# Patient Record
Sex: FEMALE | Race: WHITE | URBAN - METROPOLITAN AREA
[De-identification: names, ages, dates, MRNs, and addresses within clinical notes are randomized per-mention and may not be internally consistent; named-entity substitution may affect disease eponyms.]

---

## 2020-06-19 ENCOUNTER — OFFICE VISIT (OUTPATIENT)
Dept: URBAN - METROPOLITAN AREA CLINIC 97 | Facility: CLINIC | Age: 40
End: 2020-06-19

## 2020-07-02 ENCOUNTER — OFFICE VISIT (OUTPATIENT)
Dept: URBAN - METROPOLITAN AREA CLINIC 97 | Facility: CLINIC | Age: 40
End: 2020-07-02
Payer: COMMERCIAL

## 2020-07-02 VITALS
WEIGHT: 145 LBS | TEMPERATURE: 97.7 F | SYSTOLIC BLOOD PRESSURE: 112 MMHG | DIASTOLIC BLOOD PRESSURE: 75 MMHG | HEIGHT: 60 IN | BODY MASS INDEX: 28.47 KG/M2 | RESPIRATION RATE: 18 BRPM

## 2020-07-02 DIAGNOSIS — K50.80 CROHN'S COLITIS: ICD-10-CM

## 2020-07-02 PROCEDURE — 96413 CHEMO IV INFUSION 1 HR: CPT | Performed by: INTERNAL MEDICINE

## 2020-07-02 RX ORDER — IBUPROFEN 200 MG
TABLET ORAL
Qty: 0 | Refills: 0 | Status: ACTIVE | COMMUNITY
Start: 1900-01-01 | End: 1900-01-01

## 2020-07-02 RX ORDER — INFLIXIMAB 100 MG/10ML
INFUSE 5 MG/KG OVER NO LESS THAN 2 HOUR(S) BY INTRAVENOUS ROUTE EVERY 8 WEEKS INJECTION, POWDER, LYOPHILIZED, FOR SOLUTION INTRAVENOUS
Qty: 1 | Refills: 0 | Status: ACTIVE | COMMUNITY
Start: 1900-01-01 | End: 1900-01-01

## 2020-07-30 ENCOUNTER — OFFICE VISIT (OUTPATIENT)
Dept: URBAN - METROPOLITAN AREA CLINIC 97 | Facility: CLINIC | Age: 40
End: 2020-07-30

## 2020-08-28 ENCOUNTER — OFFICE VISIT (OUTPATIENT)
Dept: URBAN - METROPOLITAN AREA CLINIC 97 | Facility: CLINIC | Age: 40
End: 2020-08-28
Payer: COMMERCIAL

## 2020-08-28 VITALS
HEIGHT: 60 IN | WEIGHT: 146 LBS | BODY MASS INDEX: 28.66 KG/M2 | RESPIRATION RATE: 16 BRPM | DIASTOLIC BLOOD PRESSURE: 70 MMHG | SYSTOLIC BLOOD PRESSURE: 111 MMHG | TEMPERATURE: 96.9 F

## 2020-08-28 DIAGNOSIS — K50.80 CROHN'S COLITIS: ICD-10-CM

## 2020-08-28 PROCEDURE — 96413 CHEMO IV INFUSION 1 HR: CPT | Performed by: INTERNAL MEDICINE

## 2020-08-28 RX ORDER — IBUPROFEN 200 MG
TABLET ORAL
Qty: 0 | Refills: 0 | Status: ACTIVE | COMMUNITY
Start: 1900-01-01 | End: 1900-01-01

## 2020-08-28 RX ORDER — INFLIXIMAB 100 MG/10ML
INFUSE 5 MG/KG OVER NO LESS THAN 2 HOUR(S) BY INTRAVENOUS ROUTE EVERY 8 WEEKS INJECTION, POWDER, LYOPHILIZED, FOR SOLUTION INTRAVENOUS
Qty: 1 | Refills: 0 | Status: ACTIVE | COMMUNITY
Start: 1900-01-01 | End: 1900-01-01

## 2020-09-11 ENCOUNTER — OFFICE VISIT (OUTPATIENT)
Dept: URBAN - METROPOLITAN AREA CLINIC 97 | Facility: CLINIC | Age: 40
End: 2020-09-11

## 2020-10-20 ENCOUNTER — OFFICE VISIT (OUTPATIENT)
Dept: URBAN - METROPOLITAN AREA CLINIC 18 | Facility: CLINIC | Age: 40
End: 2020-10-20

## 2020-10-23 ENCOUNTER — OFFICE VISIT (OUTPATIENT)
Dept: URBAN - METROPOLITAN AREA CLINIC 97 | Facility: CLINIC | Age: 40
End: 2020-10-23
Payer: COMMERCIAL

## 2020-10-23 ENCOUNTER — LAB OUTSIDE AN ENCOUNTER (OUTPATIENT)
Dept: URBAN - METROPOLITAN AREA CLINIC 98 | Facility: CLINIC | Age: 40
End: 2020-10-23

## 2020-10-23 VITALS
RESPIRATION RATE: 16 BRPM | WEIGHT: 149 LBS | TEMPERATURE: 97.3 F | DIASTOLIC BLOOD PRESSURE: 70 MMHG | HEIGHT: 60 IN | BODY MASS INDEX: 29.25 KG/M2 | HEART RATE: 91 BPM | SYSTOLIC BLOOD PRESSURE: 122 MMHG

## 2020-10-23 DIAGNOSIS — K50.80 CROHN'S COLITIS: ICD-10-CM

## 2020-10-23 PROCEDURE — 96413 CHEMO IV INFUSION 1 HR: CPT | Performed by: INTERNAL MEDICINE

## 2020-10-23 RX ORDER — INFLIXIMAB 100 MG/10ML
INFUSE 5 MG/KG OVER NO LESS THAN 2 HOUR(S) BY INTRAVENOUS ROUTE EVERY 8 WEEKS INJECTION, POWDER, LYOPHILIZED, FOR SOLUTION INTRAVENOUS
Qty: 1 | Refills: 0 | Status: ACTIVE | COMMUNITY
Start: 1900-01-01 | End: 1900-01-01

## 2020-10-23 RX ORDER — IBUPROFEN 200 MG
TABLET ORAL
Qty: 0 | Refills: 0 | Status: ACTIVE | COMMUNITY
Start: 1900-01-01 | End: 1900-01-01

## 2020-11-02 LAB
A/G RATIO: 1.4
ALBUMIN: 4.2
ALKALINE PHOSPHATASE: 33
ALT (SGPT): 14
ANTI-INFLIXIMAB ANTIBODY: 23
AST (SGOT): 15
BASO (ABSOLUTE): 0
BASOS: 1
BILIRUBIN, TOTAL: <0.2
BUN/CREATININE RATIO: 22
BUN: 15
C-REACTIVE PROTEIN, QUANT: <1
CALCIUM: 9.1
CARBON DIOXIDE, TOTAL: 19
CHLORIDE: 106
CREATININE: 0.68
EGFR IF AFRICN AM: 127
EGFR IF NONAFRICN AM: 111
EOS (ABSOLUTE): 0.2
EOS: 3
FERRITIN, SERUM: 46
FOLATE (FOLIC ACID), SERUM: 13.9
GLOBULIN, TOTAL: 2.9
GLUCOSE: 98
HEMATOCRIT: 37.9
HEMATOLOGY COMMENTS:: (no result)
HEMOGLOBIN: 12.8
IMMATURE CELLS: (no result)
IMMATURE GRANS (ABS): 0
IMMATURE GRANULOCYTES: 0
INFLIXIMAB DRUG LEVEL: 7
IRON BIND.CAP.(TIBC): 289
IRON SATURATION: 19
IRON: 56
LYMPHS (ABSOLUTE): 3.1
LYMPHS: 38
MCH: 30.8
MCHC: 33.8
MCV: 91
MONOCYTES(ABSOLUTE): 0.7
MONOCYTES: 8
NEUTROPHILS (ABSOLUTE): 4.2
NEUTROPHILS: 50
NRBC: (no result)
PLATELETS: 376
POTASSIUM: 4.3
PROTEIN, TOTAL: 7.1
RBC: 4.15
RDW: 12.5
SEDIMENTATION RATE-WESTERGREN: 16
SODIUM: 139
UIBC: 233
VITAMIN B12: 444
VITAMIN D, 25-HYDROXY: 33.5
WBC: 8.2

## 2020-11-19 ENCOUNTER — OFFICE VISIT (OUTPATIENT)
Dept: URBAN - METROPOLITAN AREA TELEHEALTH 2 | Facility: TELEHEALTH | Age: 40
End: 2020-11-19
Payer: COMMERCIAL

## 2020-11-19 DIAGNOSIS — K50.80 CROHN'S COLITIS: ICD-10-CM

## 2020-11-19 PROCEDURE — 82397 CHEMILUMINESCENT ASSAY: CPT | Performed by: INTERNAL MEDICINE

## 2020-11-19 PROCEDURE — 99214 OFFICE O/P EST MOD 30 MIN: CPT | Performed by: INTERNAL MEDICINE

## 2020-11-19 RX ORDER — IBUPROFEN 200 MG
TABLET ORAL
Qty: 0 | Refills: 0 | Status: ACTIVE | COMMUNITY
Start: 1900-01-01

## 2020-11-19 RX ORDER — INFLIXIMAB 100 MG/10ML
INFUSE 5 MG/KG OVER NO LESS THAN 2 HOUR(S) BY INTRAVENOUS ROUTE EVERY 8 WEEKS INJECTION, POWDER, LYOPHILIZED, FOR SOLUTION INTRAVENOUS
Qty: 1 | Refills: 0 | Status: ACTIVE | COMMUNITY
Start: 1900-01-01

## 2020-11-19 NOTE — HPI-OTHER HISTORIES
41 yo female with CD. Doing well. Birthday. Staying. Remicade infusions going well. Had a 2-3 day flare before the last infusion. Had some this week.  Labs dated 10/23/20 showed IFX level of 7 and ati of 23 and will repeat with prometheus. Generally no pain diarrhea or bleeding. AGWS. No fcs. No EIM No covid  Only question is what ir prometh show ati ====================== 20    38 yo female for TH annual f/u visit. Doing well. No new syx over the past year.  No known COVID exposure ----- more or less self quarantined at home - Telehealth --- [facility].  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 Clinicals at [place] and [place].  Crohn's is lifelong.    Goal is to keep well. Periodic labs - 6-12 months. Colonoscopy 2 years preventive.  Remicade is tx and if we successfully tx, then we also prevent complications. Changing the natural history.  I reviewed the 7/15/19 colonoscopy with Aliya and it showed redness in the rectum and bx showed inflammation in 3 sites: rectum - minimal active inflammation, cecum- focal active colitis, and ileum showed active enteritis.  I sent Aliya a letter and summarized and suggested/recommended a dose increase of Remicade to 10 mg/kg for a limited time to see if the inflammatin could be better controlled.  She chose not to do that which is fine.  Now, we could recheck labs and also get stool biomarkers and see what inflammation level there is.  She agrees with that plan.   If markers are elevated, she would   Been taking Vit D, 10,000 for 8 months and now less.   =================== 19  37 yo female with Crohn's disease comes in for f/u. Last seen- below- was  and has done great since that time.  Malaika 5 mg/kg q 8 weeks. 18 IFX level was 6.2 8 lab set was nl.   No other CD meds  No pain diarrhea, bleeding.   3 past colonoscopies and one in  showed ileocolitis. Last colon . No FH colon cancer. No FH CD.  Had less that 1 year of pred. Did not get past ordered Dexa.   =========== 18  36 yo female comes in for 4 year f/u with me after seeing Patel Orellana in 2017 and having challenges getting in to see me.  Has done great.  No pain diarrhea or bleeding except for occasional increased syx week 7 before week 8 infusion.  No colonosocpy since .  Labs look great from 2017.  s/p flu shot and pneumovax.  Up to date on TDAP, a 5.    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  GYN is fine with yearly checks.  Ileocolitis by  colonoscopy with ulceration of ileum and half of colon and healed ileum and colon at  colonoscopy but with "scarrring" in colon which likely calls for q 1 (or 2) years.       Review of Systems  ConstitutionalDenies : body aches, fever, weight gain, weight loss  CardiovascularDenies : chest pain, heart racing/skipping, high blood pressure  RespiratoryDenies : chronic cough, shortness of breath, wheezing or asthma symptoms  GastrointestinalDenies : Abdominal Pain/discomfort, Anal/Rectal Pain or Itching, Anal Spasm, Black Stool, Bloating/belching/gaseouness, Change of Bowel Habit, Constipation, Diarrhea/Loose Stool, Difficulty in Swallowing, Heartburn/esophageal reflux, Hemorrhoids, Indigestion, Mucus in Stool, Nausea/vomiting, Rectal Bleeding, Unintentional Weight Loss    Vitals  Date Time BP Position Site L\R Cuff Size HR RR TEMP (F) WT  HT  BMI kg/m2 BSA m2 O2 Sat HC     2020 11:16 AM         126lbs 4oz 5'   24.66 1.56          Assessment  Crohn's Disease     555.2    Plan  OrdersPatient not identified as an unhealthy alcohol user when screene () - - 2020  Influenza immunization administered or previously received () - - 2020  BMI screening above normal () - - 2020  Current tobacco non-user (CAD, cap, COPD, PV) (dm) (IBD) (1036F, ) - - 2020  Colorectal cancer screening results documented and reviewed (PV) (3017F) - - 2020  Documentation of current medications. () - - 2020  CMP (comprehensive metabolic panel) (73613) - - 2020  Sed rate (86428) - - 2020  C-reactive protein (96130) - - 2020  Ferritin assay (74755) - - 2020  Iron + iron binding capacity panel ser/plas (29934, 15741) - - 2020  Vitamin B12 and folate measurement (64470, 84248) - - 2020  CBC with diff (44427) - - 2020  25-OH-Vitamin D (32652) - - 2020  Infliximab Concentration and Anti-Infliximab Antibody (91077) - - 2020  Lactoferrin stl QN (46093) - - 2020  Calprotectin stool (75311) - - 2020  InstructionsPatient seen today via telehealth by agreement and consent of patient in light of current COVID-19 pandemic. I used video conferencing during the visit. The patient encounter is appropriate and reasonable under the circumstances given the patient's particular presentation at this time. The patient has been advised of the followin) the potential risks and limitations of this mode of treatment (including but not limited to the absence of in-person examination); 2) the right to refuse telehealth services at any point without affecting the right to future care; 3) the right to receive in-person services, included immediately after this consultation if an urgent need arises; 4) information, including identifiable images or information from this telehealth consult, will only be shared in accordance with HIPPA regulations. Any and all of the patient's and/or patient's family member's questions on this issue have been answered. The patient has verbally consented to be treated via telehealth services. The patient has also been advised to contact this office for worsening conditions or problems, and seek emergency medical treatment and/or call 911 if the patient deems either necessary.  (For commercial payers, telehealth video only) More than half of the face-to-face time used for counseling and coordination of care.  40 min appointment (63176 EP)  I have documented a list of current medications and reviewed it with the patient.  I encouraged the patient to diet and exercise.    Get trough labs including stool biomarkers including IFX level before next infusion  Then TH visit 2 weeks after to discuss short term increase of Malaika to 10 mg/kg   Exam med 31 min [id number]         Electronically Signed by: Rikki Franks MD -Author on May 22, 2020 10:20:34 AM

## 2020-12-18 ENCOUNTER — LAB OUTSIDE AN ENCOUNTER (OUTPATIENT)
Dept: URBAN - METROPOLITAN AREA CLINIC 98 | Facility: CLINIC | Age: 40
End: 2020-12-18

## 2020-12-18 ENCOUNTER — OFFICE VISIT (OUTPATIENT)
Dept: URBAN - METROPOLITAN AREA CLINIC 97 | Facility: CLINIC | Age: 40
End: 2020-12-18
Payer: COMMERCIAL

## 2020-12-18 DIAGNOSIS — K50.80 CROHN'S COLITIS: ICD-10-CM

## 2020-12-18 PROCEDURE — 96413 CHEMO IV INFUSION 1 HR: CPT | Performed by: INTERNAL MEDICINE

## 2020-12-18 RX ORDER — IBUPROFEN 200 MG
TABLET ORAL
Qty: 0 | Refills: 0 | Status: ACTIVE | COMMUNITY
Start: 1900-01-01

## 2020-12-18 RX ORDER — INFLIXIMAB 100 MG/10ML
INFUSE 5 MG/KG OVER NO LESS THAN 2 HOUR(S) BY INTRAVENOUS ROUTE EVERY 8 WEEKS INJECTION, POWDER, LYOPHILIZED, FOR SOLUTION INTRAVENOUS
Qty: 1 | Refills: 0 | Status: ACTIVE | COMMUNITY
Start: 1900-01-01

## 2021-01-05 LAB
A/G RATIO: 1.4
ALBUMIN: 4.2
ALKALINE PHOSPHATASE: 34
ALT (SGPT): 13
ANTI-INFLIXIMAB ANTIBODY: 25
AST (SGOT): 16
BASO (ABSOLUTE): 0.1
BASOS: 1
BILIRUBIN, TOTAL: 0.2
BUN/CREATININE RATIO: 21
BUN: 14
C-REACTIVE PROTEIN, QUANT: <1
CALCIUM: 9.2
CARBON DIOXIDE, TOTAL: 18
CHLORIDE: 105
CREATININE: 0.68
EGFR IF AFRICN AM: 127
EGFR IF NONAFRICN AM: 110
EOS (ABSOLUTE): 0.3
EOS: 2
GLOBULIN, TOTAL: 3.1
GLUCOSE: 88
HEMATOCRIT: 41.1
HEMATOLOGY COMMENTS:: (no result)
HEMOGLOBIN: 13.5
IMMATURE CELLS: (no result)
IMMATURE GRANS (ABS): 0
IMMATURE GRANULOCYTES: 0
INFLIXIMAB DRUG LEVEL: 7.4
LYMPHS (ABSOLUTE): 4
LYMPHS: 38
MCH: 30.5
MCHC: 32.8
MCV: 93
MONOCYTES(ABSOLUTE): 0.9
MONOCYTES: 9
NEUTROPHILS (ABSOLUTE): 5.2
NEUTROPHILS: 50
NRBC: (no result)
PLATELETS: 413
POTASSIUM: 4.3
PROTEIN, TOTAL: 7.3
RBC: 4.43
RDW: 11.9
SEDIMENTATION RATE-WESTERGREN: 15
SODIUM: 139
WBC: 10.4

## 2021-02-12 ENCOUNTER — OFFICE VISIT (OUTPATIENT)
Dept: URBAN - METROPOLITAN AREA CLINIC 97 | Facility: CLINIC | Age: 41
End: 2021-02-12
Payer: COMMERCIAL

## 2021-02-12 DIAGNOSIS — K50.80 CROHN'S COLITIS: ICD-10-CM

## 2021-02-12 PROCEDURE — 96413 CHEMO IV INFUSION 1 HR: CPT | Performed by: INTERNAL MEDICINE

## 2021-02-12 RX ORDER — INFLIXIMAB 100 MG/10ML
INFUSE 5 MG/KG OVER NO LESS THAN 2 HOUR(S) BY INTRAVENOUS ROUTE EVERY 8 WEEKS INJECTION, POWDER, LYOPHILIZED, FOR SOLUTION INTRAVENOUS
Qty: 1 | Refills: 0 | Status: ACTIVE | COMMUNITY
Start: 1900-01-01

## 2021-02-12 RX ORDER — IBUPROFEN 200 MG
TABLET ORAL
Qty: 0 | Refills: 0 | Status: ACTIVE | COMMUNITY
Start: 1900-01-01

## 2021-04-09 ENCOUNTER — OFFICE VISIT (OUTPATIENT)
Dept: URBAN - METROPOLITAN AREA CLINIC 97 | Facility: CLINIC | Age: 41
End: 2021-04-09
Payer: COMMERCIAL

## 2021-04-09 DIAGNOSIS — K50.80 CROHN'S COLITIS: ICD-10-CM

## 2021-04-09 PROCEDURE — 96413 CHEMO IV INFUSION 1 HR: CPT | Performed by: INTERNAL MEDICINE

## 2021-04-09 RX ORDER — IBUPROFEN 200 MG
TABLET ORAL
Qty: 0 | Refills: 0 | Status: ACTIVE | COMMUNITY
Start: 1900-01-01

## 2021-04-09 RX ORDER — INFLIXIMAB 100 MG/10ML
INFUSE 5 MG/KG OVER NO LESS THAN 2 HOUR(S) BY INTRAVENOUS ROUTE EVERY 8 WEEKS INJECTION, POWDER, LYOPHILIZED, FOR SOLUTION INTRAVENOUS
Qty: 1 | Refills: 0 | Status: ACTIVE | COMMUNITY
Start: 1900-01-01

## 2021-06-11 ENCOUNTER — OFFICE VISIT (OUTPATIENT)
Dept: URBAN - METROPOLITAN AREA CLINIC 97 | Facility: CLINIC | Age: 41
End: 2021-06-11
Payer: COMMERCIAL

## 2021-06-11 ENCOUNTER — TELEPHONE ENCOUNTER (OUTPATIENT)
Dept: URBAN - METROPOLITAN AREA CLINIC 97 | Facility: CLINIC | Age: 41
End: 2021-06-11

## 2021-06-11 VITALS
HEIGHT: 60 IN | TEMPERATURE: 96.7 F | DIASTOLIC BLOOD PRESSURE: 79 MMHG | HEART RATE: 96 BPM | WEIGHT: 156 LBS | BODY MASS INDEX: 30.63 KG/M2 | RESPIRATION RATE: 18 BRPM | SYSTOLIC BLOOD PRESSURE: 105 MMHG

## 2021-06-11 DIAGNOSIS — K50.80 CROHN'S COLITIS: ICD-10-CM

## 2021-06-11 PROCEDURE — 96413 CHEMO IV INFUSION 1 HR: CPT | Performed by: INTERNAL MEDICINE

## 2021-06-11 RX ORDER — INFLIXIMAB 100 MG/10ML
INFUSE 5 MG/KG OVER NO LESS THAN 2 HOUR(S) BY INTRAVENOUS ROUTE EVERY 8 WEEKS INJECTION, POWDER, LYOPHILIZED, FOR SOLUTION INTRAVENOUS
Qty: 1 | Refills: 0 | Status: ACTIVE | COMMUNITY
Start: 1900-01-01

## 2021-06-11 RX ORDER — IBUPROFEN 200 MG
TABLET ORAL
Qty: 0 | Refills: 0 | Status: ACTIVE | COMMUNITY
Start: 1900-01-01

## 2021-06-12 ENCOUNTER — TELEPHONE ENCOUNTER (OUTPATIENT)
Dept: URBAN - METROPOLITAN AREA CLINIC 92 | Facility: CLINIC | Age: 41
End: 2021-06-12

## 2021-06-12 RX ORDER — INFLIXIMAB 100 MG/10ML
INFUSE 5 MG/KG OVER NO LESS THAN 2 HOUR(S) BY INTRAVENOUS ROUTE EVERY 8 WEEKS INJECTION, POWDER, LYOPHILIZED, FOR SOLUTION INTRAVENOUS
Qty: 1 | Refills: 0 | Status: ACTIVE | COMMUNITY
Start: 1900-01-01

## 2021-06-12 RX ORDER — INFLIXIMAB 100 MG/10ML
5MG/KG INJECTION, POWDER, LYOPHILIZED, FOR SOLUTION INTRAVENOUS
Qty: 1 BAG | Refills: 6 | OUTPATIENT
Start: 2021-06-12 | End: 2022-07-09

## 2021-06-12 RX ORDER — IBUPROFEN 200 MG
TABLET ORAL
Qty: 0 | Refills: 0 | Status: ACTIVE | COMMUNITY
Start: 1900-01-01

## 2021-08-05 ENCOUNTER — OFFICE VISIT (OUTPATIENT)
Dept: URBAN - METROPOLITAN AREA CLINIC 97 | Facility: CLINIC | Age: 41
End: 2021-08-05
Payer: COMMERCIAL

## 2021-08-05 DIAGNOSIS — K50.80 CROHN'S COLITIS: ICD-10-CM

## 2021-08-05 PROCEDURE — 96413 CHEMO IV INFUSION 1 HR: CPT | Performed by: INTERNAL MEDICINE

## 2021-08-05 RX ORDER — INFLIXIMAB 100 MG/10ML
INFUSE 5 MG/KG OVER NO LESS THAN 2 HOUR(S) BY INTRAVENOUS ROUTE EVERY 8 WEEKS INJECTION, POWDER, LYOPHILIZED, FOR SOLUTION INTRAVENOUS
Qty: 1 | Refills: 0 | Status: ACTIVE | COMMUNITY
Start: 1900-01-01

## 2021-08-05 RX ORDER — INFLIXIMAB 100 MG/10ML
5MG/KG INJECTION, POWDER, LYOPHILIZED, FOR SOLUTION INTRAVENOUS
Qty: 1 BAG | Refills: 6 | Status: ACTIVE | COMMUNITY
Start: 2021-06-12 | End: 2022-07-09

## 2021-08-05 RX ORDER — IBUPROFEN 200 MG
TABLET ORAL
Qty: 0 | Refills: 0 | Status: ACTIVE | COMMUNITY
Start: 1900-01-01

## 2021-10-01 ENCOUNTER — OFFICE VISIT (OUTPATIENT)
Dept: URBAN - METROPOLITAN AREA CLINIC 97 | Facility: CLINIC | Age: 41
End: 2021-10-01

## 2021-10-11 ENCOUNTER — OFFICE VISIT (OUTPATIENT)
Dept: URBAN - METROPOLITAN AREA CLINIC 97 | Facility: CLINIC | Age: 41
End: 2021-10-11
Payer: COMMERCIAL

## 2021-10-11 VITALS
HEIGHT: 60 IN | HEART RATE: 101 BPM | WEIGHT: 155 LBS | SYSTOLIC BLOOD PRESSURE: 110 MMHG | BODY MASS INDEX: 30.43 KG/M2 | DIASTOLIC BLOOD PRESSURE: 75 MMHG | TEMPERATURE: 97.8 F | RESPIRATION RATE: 18 BRPM

## 2021-10-11 DIAGNOSIS — K50.80 CROHN'S COLITIS: ICD-10-CM

## 2021-10-11 PROCEDURE — 96413 CHEMO IV INFUSION 1 HR: CPT | Performed by: INTERNAL MEDICINE

## 2021-10-11 RX ORDER — INFLIXIMAB 100 MG/10ML
INFUSE 5 MG/KG OVER NO LESS THAN 2 HOUR(S) BY INTRAVENOUS ROUTE EVERY 8 WEEKS INJECTION, POWDER, LYOPHILIZED, FOR SOLUTION INTRAVENOUS
Qty: 1 | Refills: 0 | Status: ACTIVE | COMMUNITY
Start: 1900-01-01

## 2021-10-11 RX ORDER — IBUPROFEN 200 MG
TABLET ORAL
Qty: 0 | Refills: 0 | Status: ACTIVE | COMMUNITY
Start: 1900-01-01

## 2021-10-11 RX ORDER — INFLIXIMAB 100 MG/10ML
5MG/KG INJECTION, POWDER, LYOPHILIZED, FOR SOLUTION INTRAVENOUS
Qty: 1 BAG | Refills: 6 | Status: ACTIVE | COMMUNITY
Start: 2021-06-12 | End: 2022-07-09

## 2021-12-06 ENCOUNTER — OFFICE VISIT (OUTPATIENT)
Dept: URBAN - METROPOLITAN AREA CLINIC 97 | Facility: CLINIC | Age: 41
End: 2021-12-06
Payer: COMMERCIAL

## 2021-12-06 ENCOUNTER — TELEPHONE ENCOUNTER (OUTPATIENT)
Dept: URBAN - METROPOLITAN AREA CLINIC 97 | Facility: CLINIC | Age: 41
End: 2021-12-06

## 2021-12-06 VITALS
TEMPERATURE: 98.1 F | WEIGHT: 154 LBS | DIASTOLIC BLOOD PRESSURE: 77 MMHG | BODY MASS INDEX: 30.23 KG/M2 | SYSTOLIC BLOOD PRESSURE: 110 MMHG | RESPIRATION RATE: 18 BRPM | HEART RATE: 87 BPM | HEIGHT: 60 IN

## 2021-12-06 DIAGNOSIS — K50.80 CROHN'S COLITIS: ICD-10-CM

## 2021-12-06 PROCEDURE — 96413 CHEMO IV INFUSION 1 HR: CPT | Performed by: INTERNAL MEDICINE

## 2021-12-06 RX ORDER — INFLIXIMAB 100 MG/10ML
INFUSE 5 MG/KG OVER NO LESS THAN 2 HOUR(S) BY INTRAVENOUS ROUTE EVERY 8 WEEKS INJECTION, POWDER, LYOPHILIZED, FOR SOLUTION INTRAVENOUS
Qty: 1 | Refills: 0 | Status: ACTIVE | COMMUNITY
Start: 1900-01-01

## 2021-12-06 RX ORDER — IBUPROFEN 200 MG
TABLET ORAL
Qty: 0 | Refills: 0 | Status: ACTIVE | COMMUNITY
Start: 1900-01-01

## 2021-12-06 RX ORDER — INFLIXIMAB 100 MG/10ML
5MG/KG INJECTION, POWDER, LYOPHILIZED, FOR SOLUTION INTRAVENOUS
Qty: 1 BAG | Refills: 6 | Status: ACTIVE | COMMUNITY
Start: 2021-06-12 | End: 2022-07-09

## 2021-12-07 ENCOUNTER — TELEPHONE ENCOUNTER (OUTPATIENT)
Dept: URBAN - METROPOLITAN AREA CLINIC 92 | Facility: CLINIC | Age: 41
End: 2021-12-07

## 2022-01-12 ENCOUNTER — TELEPHONE ENCOUNTER (OUTPATIENT)
Dept: URBAN - METROPOLITAN AREA CLINIC 98 | Facility: CLINIC | Age: 42
End: 2022-01-12

## 2022-01-31 ENCOUNTER — OFFICE VISIT (OUTPATIENT)
Dept: URBAN - METROPOLITAN AREA CLINIC 97 | Facility: CLINIC | Age: 42
End: 2022-01-31
Payer: COMMERCIAL

## 2022-01-31 VITALS
HEIGHT: 60 IN | RESPIRATION RATE: 18 BRPM | HEART RATE: 102 BPM | SYSTOLIC BLOOD PRESSURE: 115 MMHG | BODY MASS INDEX: 30.63 KG/M2 | WEIGHT: 156 LBS | DIASTOLIC BLOOD PRESSURE: 77 MMHG | TEMPERATURE: 96.6 F

## 2022-01-31 DIAGNOSIS — K50.80 CROHN'S COLITIS: ICD-10-CM

## 2022-01-31 PROCEDURE — 96413 CHEMO IV INFUSION 1 HR: CPT | Performed by: INTERNAL MEDICINE

## 2022-01-31 RX ORDER — IBUPROFEN 200 MG
TABLET ORAL
Qty: 0 | Refills: 0 | Status: ACTIVE | COMMUNITY
Start: 1900-01-01

## 2022-01-31 RX ORDER — INFLIXIMAB 100 MG/10ML
5MG/KG INJECTION, POWDER, LYOPHILIZED, FOR SOLUTION INTRAVENOUS
Qty: 1 BAG | Refills: 6 | Status: ACTIVE | COMMUNITY
Start: 2021-06-12 | End: 2022-07-09

## 2022-01-31 RX ORDER — INFLIXIMAB 100 MG/10ML
INFUSE 5 MG/KG OVER NO LESS THAN 2 HOUR(S) BY INTRAVENOUS ROUTE EVERY 8 WEEKS INJECTION, POWDER, LYOPHILIZED, FOR SOLUTION INTRAVENOUS
Qty: 1 | Refills: 0 | Status: ACTIVE | COMMUNITY
Start: 1900-01-01

## 2022-03-21 ENCOUNTER — LAB OUTSIDE AN ENCOUNTER (OUTPATIENT)
Dept: URBAN - METROPOLITAN AREA CLINIC 98 | Facility: CLINIC | Age: 42
End: 2022-03-21

## 2022-03-28 ENCOUNTER — OFFICE VISIT (OUTPATIENT)
Dept: URBAN - METROPOLITAN AREA CLINIC 97 | Facility: CLINIC | Age: 42
End: 2022-03-28
Payer: COMMERCIAL

## 2022-03-28 VITALS
TEMPERATURE: 97.4 F | RESPIRATION RATE: 18 BRPM | BODY MASS INDEX: 30.63 KG/M2 | WEIGHT: 156 LBS | DIASTOLIC BLOOD PRESSURE: 81 MMHG | HEIGHT: 60 IN | HEART RATE: 92 BPM | SYSTOLIC BLOOD PRESSURE: 112 MMHG

## 2022-03-28 DIAGNOSIS — K50.80 CROHN'S COLITIS: ICD-10-CM

## 2022-03-28 LAB
A/G RATIO: 1.4
ALBUMIN: 4.5
ALKALINE PHOSPHATASE: 39
ALT (SGPT): 13
ANTI-INFLIXIMAB ANTIBODY: <22
AST (SGOT): 17
BASO (ABSOLUTE): 0.1
BASOS: 1
BILIRUBIN, TOTAL: <0.2
BUN/CREATININE RATIO: 17
BUN: 12
C-REACTIVE PROTEIN, QUANT: 1
CALCIUM: 9.5
CARBON DIOXIDE, TOTAL: 15
CHLORIDE: 102
CREATININE: 0.7
EGFR: 111
EOS (ABSOLUTE): 0.2
EOS: 2
FERRITIN, SERUM: 67
FOLATE (FOLIC ACID), SERUM: 16.4
GLOBULIN, TOTAL: 3.3
GLUCOSE: 79
HBSAG SCREEN: NEGATIVE
HEMATOCRIT: 41.4
HEMATOLOGY COMMENTS:: (no result)
HEMOGLOBIN: 13.6
HEP B SURFACE AB, QUAL: REACTIVE
IMMATURE CELLS: (no result)
IMMATURE GRANS (ABS): 0
IMMATURE GRANULOCYTES: 0
INFLIXIMAB DRUG LEVEL: 11
IRON BIND.CAP.(TIBC): 346
IRON SATURATION: 27
IRON: 95
LYMPHS (ABSOLUTE): 4.1
LYMPHS: 44
MCH: 30.1
MCHC: 32.9
MCV: 92
MONOCYTES(ABSOLUTE): 0.9
MONOCYTES: 10
NEUTROPHILS (ABSOLUTE): 4
NEUTROPHILS: 43
NRBC: (no result)
PLATELETS: 367
POTASSIUM: 4.3
PROTEIN, TOTAL: 7.8
QUANTIFERON CRITERIA: (no result)
QUANTIFERON INCUBATION: (no result)
QUANTIFERON MITOGEN VALUE: >10
QUANTIFERON NIL VALUE: 0.06
QUANTIFERON TB1 AG VALUE: 0.1
QUANTIFERON TB2 AG VALUE: 0.1
QUANTIFERON-TB GOLD PLUS: NEGATIVE
RBC: 4.52
RDW: 12.1
SEDIMENTATION RATE-WESTERGREN: 33
SODIUM: 140
UIBC: 251
VITAMIN B12: 345
VITAMIN D, 25-HYDROXY: 35.7
WBC: 9.3

## 2022-03-28 PROCEDURE — 96413 CHEMO IV INFUSION 1 HR: CPT | Performed by: INTERNAL MEDICINE

## 2022-03-28 RX ORDER — INFLIXIMAB 100 MG/10ML
INFUSE 5 MG/KG OVER NO LESS THAN 2 HOUR(S) BY INTRAVENOUS ROUTE EVERY 8 WEEKS INJECTION, POWDER, LYOPHILIZED, FOR SOLUTION INTRAVENOUS
Qty: 1 | Refills: 0 | Status: ACTIVE | COMMUNITY
Start: 1900-01-01

## 2022-03-28 RX ORDER — IBUPROFEN 200 MG
TABLET ORAL
Qty: 0 | Refills: 0 | Status: ACTIVE | COMMUNITY
Start: 1900-01-01

## 2022-03-28 RX ORDER — INFLIXIMAB 100 MG/10ML
5MG/KG INJECTION, POWDER, LYOPHILIZED, FOR SOLUTION INTRAVENOUS
Qty: 1 BAG | Refills: 6 | Status: ACTIVE | COMMUNITY
Start: 2021-06-12 | End: 2022-07-09

## 2022-04-08 ENCOUNTER — TELEPHONE ENCOUNTER (OUTPATIENT)
Dept: URBAN - METROPOLITAN AREA CLINIC 98 | Facility: CLINIC | Age: 42
End: 2022-04-08

## 2022-04-23 ENCOUNTER — TELEPHONE ENCOUNTER (OUTPATIENT)
Dept: URBAN - METROPOLITAN AREA CLINIC 92 | Facility: CLINIC | Age: 42
End: 2022-04-23

## 2022-04-23 RX ORDER — INFLIXIMAB 100 MG/10ML
AS DIRECTED INJECTION, POWDER, LYOPHILIZED, FOR SOLUTION INTRAVENOUS
Qty: 100 MILLIGRAMS | Refills: 0 | OUTPATIENT
Start: 2022-04-23 | End: 2022-05-23

## 2022-05-20 ENCOUNTER — WEB ENCOUNTER (OUTPATIENT)
Dept: URBAN - METROPOLITAN AREA CLINIC 97 | Facility: CLINIC | Age: 42
End: 2022-05-20

## 2022-05-25 ENCOUNTER — TELEPHONE ENCOUNTER (OUTPATIENT)
Dept: URBAN - METROPOLITAN AREA CLINIC 98 | Facility: CLINIC | Age: 42
End: 2022-05-25

## 2022-05-25 ENCOUNTER — OFFICE VISIT (OUTPATIENT)
Dept: URBAN - METROPOLITAN AREA CLINIC 97 | Facility: CLINIC | Age: 42
End: 2022-05-25
Payer: COMMERCIAL

## 2022-05-25 DIAGNOSIS — K50.90 CROHN DISEASE: ICD-10-CM

## 2022-05-25 PROCEDURE — 96413 CHEMO IV INFUSION 1 HR: CPT | Performed by: INTERNAL MEDICINE

## 2022-05-25 RX ORDER — IBUPROFEN 200 MG
TABLET ORAL
Qty: 0 | Refills: 0 | Status: ACTIVE | COMMUNITY
Start: 1900-01-01

## 2022-05-25 RX ORDER — INFLIXIMAB 100 MG/10ML
INFUSE 5 MG/KG OVER NO LESS THAN 2 HOUR(S) BY INTRAVENOUS ROUTE EVERY 8 WEEKS INJECTION, POWDER, LYOPHILIZED, FOR SOLUTION INTRAVENOUS
Qty: 1 | Refills: 0 | Status: ACTIVE | COMMUNITY
Start: 1900-01-01

## 2022-05-25 RX ORDER — INFLIXIMAB 100 MG/10ML
5MG/KG INJECTION, POWDER, LYOPHILIZED, FOR SOLUTION INTRAVENOUS
Qty: 1 BAG | Refills: 6 | Status: ACTIVE | COMMUNITY
Start: 2021-06-12 | End: 2022-07-09

## 2022-07-12 ENCOUNTER — WEB ENCOUNTER (OUTPATIENT)
Dept: URBAN - METROPOLITAN AREA CLINIC 97 | Facility: CLINIC | Age: 42
End: 2022-07-12

## 2022-07-13 ENCOUNTER — TELEPHONE ENCOUNTER (OUTPATIENT)
Dept: URBAN - METROPOLITAN AREA CLINIC 97 | Facility: CLINIC | Age: 42
End: 2022-07-13

## 2022-07-18 ENCOUNTER — OFFICE VISIT (OUTPATIENT)
Dept: URBAN - METROPOLITAN AREA CLINIC 97 | Facility: CLINIC | Age: 42
End: 2022-07-18

## 2022-07-18 ENCOUNTER — TELEPHONE ENCOUNTER (OUTPATIENT)
Dept: URBAN - METROPOLITAN AREA CLINIC 97 | Facility: CLINIC | Age: 42
End: 2022-07-18

## 2022-07-20 ENCOUNTER — OFFICE VISIT (OUTPATIENT)
Dept: URBAN - METROPOLITAN AREA CLINIC 97 | Facility: CLINIC | Age: 42
End: 2022-07-20
Payer: COMMERCIAL

## 2022-07-20 VITALS
RESPIRATION RATE: 18 BRPM | TEMPERATURE: 97.3 F | HEART RATE: 105 BPM | HEIGHT: 60 IN | WEIGHT: 156 LBS | SYSTOLIC BLOOD PRESSURE: 115 MMHG | DIASTOLIC BLOOD PRESSURE: 81 MMHG | BODY MASS INDEX: 30.63 KG/M2

## 2022-07-20 DIAGNOSIS — K50.80 CROHN'S COLITIS: ICD-10-CM

## 2022-07-20 PROCEDURE — 96413 CHEMO IV INFUSION 1 HR: CPT | Performed by: INTERNAL MEDICINE

## 2022-07-20 RX ORDER — INFLIXIMAB 100 MG/10ML
INFUSE 5 MG/KG OVER NO LESS THAN 2 HOUR(S) BY INTRAVENOUS ROUTE EVERY 8 WEEKS INJECTION, POWDER, LYOPHILIZED, FOR SOLUTION INTRAVENOUS
Qty: 1 | Refills: 0 | Status: ACTIVE | COMMUNITY
Start: 1900-01-01

## 2022-07-20 RX ORDER — IBUPROFEN 200 MG
TABLET ORAL
Qty: 0 | Refills: 0 | Status: ACTIVE | COMMUNITY
Start: 1900-01-01

## 2022-08-24 ENCOUNTER — TELEPHONE ENCOUNTER (OUTPATIENT)
Dept: URBAN - METROPOLITAN AREA CLINIC 98 | Facility: CLINIC | Age: 42
End: 2022-08-24

## 2022-08-29 ENCOUNTER — TELEPHONE ENCOUNTER (OUTPATIENT)
Dept: URBAN - METROPOLITAN AREA CLINIC 97 | Facility: CLINIC | Age: 42
End: 2022-08-29

## 2022-08-29 ENCOUNTER — OFFICE VISIT (OUTPATIENT)
Dept: URBAN - METROPOLITAN AREA TELEHEALTH 2 | Facility: TELEHEALTH | Age: 42
End: 2022-08-29
Payer: COMMERCIAL

## 2022-08-29 VITALS — HEIGHT: 60 IN | WEIGHT: 156 LBS | BODY MASS INDEX: 30.63 KG/M2

## 2022-08-29 DIAGNOSIS — K50.80 CROHN'S COLITIS: ICD-10-CM

## 2022-08-29 PROCEDURE — 99215 OFFICE O/P EST HI 40 MIN: CPT | Performed by: INTERNAL MEDICINE

## 2022-08-29 RX ORDER — INFLIXIMAB 100 MG/10ML
INFUSE 5 MG/KG OVER NO LESS THAN 2 HOUR(S) BY INTRAVENOUS ROUTE EVERY 8 WEEKS INJECTION, POWDER, LYOPHILIZED, FOR SOLUTION INTRAVENOUS
Qty: 1 | Refills: 0 | Status: ACTIVE | COMMUNITY
Start: 1900-01-01

## 2022-08-29 RX ORDER — IBUPROFEN 200 MG
TABLET ORAL
Qty: 0 | Refills: 0 | Status: ACTIVE | COMMUNITY
Start: 1900-01-01

## 2022-08-29 NOTE — HPI-TODAY'S VISIT:
40 yo female with CD dx . Been on Malaika since 2008.  At last TH visit 20 Aliya was doing well and on Lab dav assay had ifx level of 7 and low grade ati. Repeat as Prometheus showed no ati.  Had last dose of Remicade 1st quarter 2022 and now has had 2 doses of inflectra. She does not think the inflectra. Next infusion is   Has had standard dose Malaika 5mg /kg q 8 weeks and no pain diarrhea or bleeding.  Since starting inflectra she has had increased syx of pain and diarrhea as well as joint pain, now 4 weeks before the next infusion. Now has new rectal bleeding.    ======================= 20  41 yo female with CD. Doing well. Birthday. Staying. Remicade infusions going well. Had a 2-3 day flare before the last infusion. Had some this week.  Labs dated 10/23/20 showed IFX level of 7 and ati of 23 and will repeat with prometheus. Generally no pain diarrhea or bleeding. AGWS. No fcs. No EIM No covid  Only question is what ir prometh show ati ====================== 20    38 yo female for TH annual f/u visit. Doing well. No new syx over the past year.  No known COVID exposure ----- more or less self quarantined at home - Telehealth --- [facility] [employer] [facility].  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 Clinicals at [place] and [place].  Crohn's is lifelong.    Goal is to keep well. Periodic labs - 6-12 months. Colonoscopy 2 years preventive.  Remicade is tx and if we successfully tx, then we also prevent complications. Changing the natural history.  I reviewed the 7/15/19 colonoscopy with Aliya and it showed redness in the rectum and bx showed inflammation in 3 sites: rectum - minimal active inflammation, cecum- focal active colitis, and ileum showed active enteritis.  I sent Aliya a letter and summarized and suggested/recommended a dose increase of Remicade to 10 mg/kg for a limited time to see if the inflammatin could be better controlled.  She chose not to do that which is fine.  Now, we could recheck labs and also get stool biomarkers and see what inflammation level there is.  She agrees with that plan.   If markers are elevated, she would   Been taking Vit D, 10,000 for 8 months and now less.   =================== 19  37 yo female with Crohn's disease comes in for f/u. Last seen- below- was  and has done great since that time.  Malaika 5 mg/kg q 8 weeks. 18 IFX level was 6.2 8 lab set was nl.   No other CD meds  No pain diarrhea, bleeding.   3 past colonoscopies and one in  showed ileocolitis. Last colon . No FH colon cancer. No FH CD.  Had less that 1 year of pred. Did not get past ordered Dexa.   =========== 18  38 yo female comes in for 4 year f/u with me after seeing Patel Orellana in 2017 and having challenges getting in to see me.  Has done great.  No pain diarrhea or bleeding except for occasional increased syx week 7 before week 8 infusion.  No colonosocpy since .  Labs look great from 2017.  s/p flu shot and pneumovax.  Up to date on TDAP, a 5.    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  GYN is fine with yearly checks.  Ileocolitis by  colonoscopy with ulceration of ileum and half of colon and healed ileum and colon at  colonoscopy but with "scarrring" in colon which likely calls for q 1 (or 2) years.       Review of Systems  ConstitutionalDenies : body aches, fever, weight gain, weight loss  CardiovascularDenies : chest pain, heart racing/skipping, high blood pressure  RespiratoryDenies : chronic cough, shortness of breath, wheezing or asthma symptoms  GastrointestinalDenies : Abdominal Pain/discomfort, Anal/Rectal Pain or Itching, Anal Spasm, Black Stool, Bloating/belching/gaseouness, Change of Bowel Habit, Constipation, Diarrhea/Loose Stool, Difficulty in Swallowing, Heartburn/esophageal reflux, Hemorrhoids, Indigestion, Mucus in Stool, Nausea/vomiting, Rectal Bleeding, Unintentional Weight Loss    Vitals  Date Time BP Position Site L\R Cuff Size HR RR TEMP (F) WT  HT  BMI kg/m2 BSA m2 O2 Sat HC     2020 11:16 AM         126lbs 4oz 5'   24.66 1.56          Assessment  Crohn's Disease     555.2    Plan  OrdersPatient not identified as an unhealthy alcohol user when screene () - - 2020  Influenza immunization administered or previously received () - - 2020  BMI screening above normal () - - 2020  Current tobacco non-user (CAD, cap, COPD, PV) (dm) (IBD) (1036F, ) - - 2020  Colorectal cancer screening results documented and reviewed (PV) (3017F) - - 2020  Documentation of current medications. () - - 2020  CMP (comprehensive metabolic panel) (89106) - - 2020  Sed rate (17952) - - 2020  C-reactive protein (85306) - - 2020  Ferritin assay (57845) - - 2020  Iron + iron binding capacity panel ser/plas (10591, 82448) - - 2020  Vitamin B12 and folate measurement (13467, 33277) - - 2020  CBC with diff (56024) - - 2020  25-OH-Vitamin D (25735) - - 2020  Infliximab Concentration and Anti-Infliximab Antibody (50997) - - 2020  Lactoferrin stl QN (20986) - - 2020  Calprotectin stool (32552) - - 2020  InstructionsPatient seen today via telehealth by agreement and consent of patient in light of current COVID-19 pandemic. I used video conferencing during the visit. The patient encounter is appropriate and reasonable under the circumstances given the patient's particular presentation at this time. The patient has been advised of the followin) the potential risks and limitations of this mode of treatment (including but not limited to the absence of in-person examination); 2) the right to refuse telehealth services at any point without affecting the right to future care; 3) the right to receive in-person services, included immediately after this consultation if an urgent need arises; 4) information, including identifiable images or information from this telehealth consult, will only be shared in accordance with HIPPA regulations. Any and all of the patient's and/or patient's family member's questions on this issue have been answered. The patient has verbally consented to be treated via telehealth services. The patient has also been advised to contact this office for worsening conditions or problems, and seek emergency medical treatment and/or call 911 if the patient deems either necessary.  (For commercial payers, telehealth video only) More than half of the face-to-face time used for counseling and coordination of care.  40 min appointment (45941 EP)  I have documented a list of current medications and reviewed it with the patient.  I encouraged the patient to diet and exercise.    Get trough labs including stool biomarkers including IFX level before next infusion  Then TH visit 2 weeks after to discuss short term increase of Malaika to 10 mg/kg   Exam med 31 min [id number]         Electronically Signed by: Rikki Franks MD -Author on May 22, 2020 10:20:34 AM

## 2022-08-30 ENCOUNTER — TELEPHONE ENCOUNTER (OUTPATIENT)
Dept: URBAN - METROPOLITAN AREA CLINIC 97 | Facility: CLINIC | Age: 42
End: 2022-08-30

## 2022-09-04 ENCOUNTER — TELEPHONE ENCOUNTER (OUTPATIENT)
Dept: URBAN - METROPOLITAN AREA CLINIC 97 | Facility: CLINIC | Age: 42
End: 2022-09-04

## 2022-09-12 ENCOUNTER — OFFICE VISIT (OUTPATIENT)
Dept: URBAN - METROPOLITAN AREA CLINIC 97 | Facility: CLINIC | Age: 42
End: 2022-09-12

## 2022-09-12 ENCOUNTER — CLAIMS CREATED FROM THE CLAIM WINDOW (OUTPATIENT)
Dept: URBAN - METROPOLITAN AREA CLINIC 97 | Facility: CLINIC | Age: 42
End: 2022-09-12
Payer: COMMERCIAL

## 2022-09-12 VITALS
DIASTOLIC BLOOD PRESSURE: 79 MMHG | WEIGHT: 154 LBS | HEIGHT: 60 IN | RESPIRATION RATE: 20 BRPM | SYSTOLIC BLOOD PRESSURE: 121 MMHG | TEMPERATURE: 98.4 F | HEART RATE: 97 BPM | BODY MASS INDEX: 30.23 KG/M2

## 2022-09-12 DIAGNOSIS — K50.80 CROHN'S COLITIS: ICD-10-CM

## 2022-09-12 LAB
A/G RATIO: 1.4
ALBUMIN: 4.2
ALKALINE PHOSPHATASE: 43
ALT (SGPT): 17
ANTI-INFLIXIMAB ANTIBODY: <22
AST (SGOT): 20
BASO (ABSOLUTE): 0.1
BASOS: 1
BILIRUBIN, TOTAL: <0.2
BUN/CREATININE RATIO: 17
BUN: 11
C-REACTIVE PROTEIN, QUANT: 1
CALCIUM: 8.9
CALPROTECTIN, FECAL: (no result)
CARBON DIOXIDE, TOTAL: 18
CHLORIDE: 108
CREATININE: 0.65
EGFR: 113
EOS (ABSOLUTE): 0.2
EOS: 2
FERRITIN, SERUM: 61
FOLATE (FOLIC ACID), SERUM: 14.7
GLOBULIN, TOTAL: 2.9
GLUCOSE: 83
HEMATOCRIT: 41.8
HEMATOLOGY COMMENTS:: (no result)
HEMOGLOBIN: 13.2
IMMATURE CELLS: (no result)
IMMATURE GRANS (ABS): 0
IMMATURE GRANULOCYTES: 0
INFLIXIMAB DRUG LEVEL: 11
IRON BIND.CAP.(TIBC): 303
IRON SATURATION: 23
IRON: 69
LACTOFERRIN, FECAL, QUANT.: (no result)
LYMPHS (ABSOLUTE): 3.4
LYMPHS: 34
MCH: 29.6
MCHC: 31.6
MCV: 94
MONOCYTES(ABSOLUTE): 1
MONOCYTES: 10
NEUTROPHILS (ABSOLUTE): 5.3
NEUTROPHILS: 53
NRBC: (no result)
PLATELETS: 358
POTASSIUM: 4.6
PROTEIN, TOTAL: 7.1
RBC: 4.46
RDW: 12.4
REQUEST PROBLEM: (no result)
SEDIMENTATION RATE-WESTERGREN: 24
SODIUM: 141
UIBC: 234
VITAMIN B12: 381
VITAMIN D, 25-HYDROXY: 24.2
WBC: 10.1

## 2022-09-12 PROCEDURE — 96413 CHEMO IV INFUSION 1 HR: CPT | Performed by: INTERNAL MEDICINE

## 2022-09-12 RX ORDER — INFLIXIMAB 100 MG/10ML
INFUSE 5 MG/KG OVER NO LESS THAN 2 HOUR(S) BY INTRAVENOUS ROUTE EVERY 8 WEEKS INJECTION, POWDER, LYOPHILIZED, FOR SOLUTION INTRAVENOUS
Qty: 1 | Refills: 0 | Status: ACTIVE | COMMUNITY
Start: 1900-01-01

## 2022-09-12 RX ORDER — IBUPROFEN 200 MG
TABLET ORAL
Qty: 0 | Refills: 0 | Status: ACTIVE | COMMUNITY
Start: 1900-01-01

## 2022-09-16 ENCOUNTER — TELEPHONE ENCOUNTER (OUTPATIENT)
Dept: URBAN - METROPOLITAN AREA CLINIC 98 | Facility: CLINIC | Age: 42
End: 2022-09-16

## 2022-09-16 RX ORDER — ERGOCALCIFEROL CAPSULES, 1.25 MG/1
1 CAPSULE CAPSULE ORAL
Qty: 12 CAPSULES | Refills: 1 | OUTPATIENT
Start: 2022-09-16 | End: 2023-03-15

## 2022-09-26 ENCOUNTER — OFFICE VISIT (OUTPATIENT)
Dept: URBAN - METROPOLITAN AREA CLINIC 98 | Facility: CLINIC | Age: 42
End: 2022-09-26

## 2022-11-16 ENCOUNTER — OFFICE VISIT (OUTPATIENT)
Dept: URBAN - METROPOLITAN AREA CLINIC 97 | Facility: CLINIC | Age: 42
End: 2022-11-16
Payer: COMMERCIAL

## 2022-11-16 VITALS
DIASTOLIC BLOOD PRESSURE: 84 MMHG | SYSTOLIC BLOOD PRESSURE: 118 MMHG | HEART RATE: 84 BPM | RESPIRATION RATE: 20 BRPM | HEIGHT: 60 IN | TEMPERATURE: 98.6 F | BODY MASS INDEX: 30.82 KG/M2 | WEIGHT: 157 LBS

## 2022-11-16 DIAGNOSIS — K50.80 CROHN'S COLITIS: ICD-10-CM

## 2022-11-16 PROCEDURE — 96413 CHEMO IV INFUSION 1 HR: CPT | Performed by: INTERNAL MEDICINE

## 2022-11-16 RX ORDER — IBUPROFEN 200 MG
TABLET ORAL
Qty: 0 | Refills: 0 | Status: ACTIVE | COMMUNITY
Start: 1900-01-01

## 2022-11-16 RX ORDER — INFLIXIMAB 100 MG/10ML
INFUSE 5 MG/KG OVER NO LESS THAN 2 HOUR(S) BY INTRAVENOUS ROUTE EVERY 8 WEEKS INJECTION, POWDER, LYOPHILIZED, FOR SOLUTION INTRAVENOUS
Qty: 1 | Refills: 0 | Status: ACTIVE | COMMUNITY
Start: 1900-01-01

## 2022-11-16 RX ORDER — ERGOCALCIFEROL CAPSULES, 1.25 MG/1
1 CAPSULE CAPSULE ORAL
Qty: 12 CAPSULES | Refills: 1 | Status: ACTIVE | COMMUNITY
Start: 2022-09-16 | End: 2023-03-15

## 2023-01-09 ENCOUNTER — OFFICE VISIT (OUTPATIENT)
Dept: URBAN - METROPOLITAN AREA CLINIC 97 | Facility: CLINIC | Age: 43
End: 2023-01-09
Payer: COMMERCIAL

## 2023-01-09 VITALS
HEART RATE: 86 BPM | WEIGHT: 156.8 LBS | TEMPERATURE: 98 F | RESPIRATION RATE: 16 BRPM | BODY MASS INDEX: 30.78 KG/M2 | SYSTOLIC BLOOD PRESSURE: 114 MMHG | HEIGHT: 60 IN | DIASTOLIC BLOOD PRESSURE: 74 MMHG

## 2023-01-09 DIAGNOSIS — K50.80 CROHN'S COLITIS: ICD-10-CM

## 2023-01-09 PROCEDURE — 96413 CHEMO IV INFUSION 1 HR: CPT | Performed by: INTERNAL MEDICINE

## 2023-01-09 RX ORDER — ERGOCALCIFEROL CAPSULES, 1.25 MG/1
1 CAPSULE CAPSULE ORAL
Qty: 12 CAPSULES | Refills: 1 | Status: ACTIVE | COMMUNITY
Start: 2022-09-16 | End: 2023-03-15

## 2023-01-09 RX ORDER — IBUPROFEN 200 MG
TABLET ORAL
Qty: 0 | Refills: 0 | Status: ACTIVE | COMMUNITY
Start: 1900-01-01

## 2023-01-09 RX ORDER — INFLIXIMAB 100 MG/10ML
INFUSE 5 MG/KG OVER NO LESS THAN 2 HOUR(S) BY INTRAVENOUS ROUTE EVERY 8 WEEKS INJECTION, POWDER, LYOPHILIZED, FOR SOLUTION INTRAVENOUS
Qty: 1 | Refills: 0 | Status: ACTIVE | COMMUNITY
Start: 1900-01-01

## 2023-02-06 ENCOUNTER — OFFICE VISIT (OUTPATIENT)
Dept: URBAN - METROPOLITAN AREA CLINIC 98 | Facility: CLINIC | Age: 43
End: 2023-02-06
Payer: COMMERCIAL

## 2023-02-06 VITALS
HEART RATE: 92 BPM | DIASTOLIC BLOOD PRESSURE: 74 MMHG | TEMPERATURE: 98 F | SYSTOLIC BLOOD PRESSURE: 114 MMHG | HEIGHT: 60 IN | BODY MASS INDEX: 30.63 KG/M2 | WEIGHT: 156 LBS

## 2023-02-06 DIAGNOSIS — R11.2 NAUSEA AND VOMITING, UNSPECIFIED VOMITING TYPE: ICD-10-CM

## 2023-02-06 DIAGNOSIS — M25.50 ARTHRALGIA, UNSPECIFIED JOINT: ICD-10-CM

## 2023-02-06 DIAGNOSIS — K50.80 CROHN'S COLITIS: ICD-10-CM

## 2023-02-06 DIAGNOSIS — R19.7 DIARRHEA, UNSPECIFIED TYPE: ICD-10-CM

## 2023-02-06 DIAGNOSIS — K50.90 CROHN'S DISEASE: ICD-10-CM

## 2023-02-06 DIAGNOSIS — D84.9 IMMUNOSUPPRESSION: ICD-10-CM

## 2023-02-06 PROBLEM — 71833008 CROHN'S DISEASE OF SMALL AND LARGE INTESTINES: Status: ACTIVE | Noted: 2023-01-10

## 2023-02-06 PROBLEM — 38013005: Status: ACTIVE | Noted: 2023-02-06

## 2023-02-06 PROCEDURE — 99215 OFFICE O/P EST HI 40 MIN: CPT | Performed by: INTERNAL MEDICINE

## 2023-02-06 RX ORDER — INFLIXIMAB 100 MG/10ML
INFUSE 5 MG/KG OVER NO LESS THAN 2 HOUR(S) BY INTRAVENOUS ROUTE EVERY 8 WEEKS INJECTION, POWDER, LYOPHILIZED, FOR SOLUTION INTRAVENOUS
Qty: 1 | Refills: 0 | Status: ACTIVE | COMMUNITY
Start: 1900-01-01

## 2023-02-06 RX ORDER — IBUPROFEN 200 MG
TABLET ORAL
Qty: 0 | Refills: 0 | Status: ACTIVE | COMMUNITY
Start: 1900-01-01

## 2023-02-06 RX ORDER — ERGOCALCIFEROL CAPSULES, 1.25 MG/1
1 CAPSULE CAPSULE ORAL
Qty: 12 CAPSULES | Refills: 1 | Status: ACTIVE | COMMUNITY
Start: 2022-09-16 | End: 2023-03-15

## 2023-02-06 NOTE — HPI-TODAY'S VISIT:
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  Due for re-authorization.  Still not doing as well on Remicade secondary to prior switch to inflectra and now s/p 2 doses of REmicade.  Crampy pain that she had not had in years, after eating and other times, especially at night and may keep her from sleeping.  Diarrhea: no diarrhea pre inflectra, and now "it is so weird" and may vomit and have diarrhea. Since Nov, 3 vomiting. [id number] times.  Aliya is still benefitting from Remicade//infliximab as she has no ER visits and benefits from the infusion, but there is more and more breakthrough.  She is still getting 5 mg/kg so maybe she needs dose upward adjustment.  She has new upper symptoms/heartburn and burning - so needs an egd. ======================================  22  40 yo female with CD dx . Been on Malaika since 2008.  At last TH visit 20 Aliya was doing well and on Lab dav assay had ifx level of 7 and low grade ati. Repeat as Prometheus showed no ati.  Had last dose of Remicade 1st quarter 2022 and now has had 2 doses of inflectra. She does not think the inflectra. Next infusion is   Has had standard dose Malaika 5mg /kg q 8 weeks and no pain diarrhea or bleeding.  Since starting inflectra she has had increased syx of pain and diarrhea as well as joint pain, now 4 weeks before the next infusion. Now has new rectal bleeding.    ======================= 20  41 yo female with CD. Doing well. Birthday. Staying. Remicade infusions going well. Had a 2-3 day flare before the last infusion. Had some this week.  Labs dated 10/23/20 showed IFX level of 7 and ati of 23 and will repeat with prometheus. Generally no pain diarrhea or bleeding. AGWS. No fcs. No EIM No covid  Only question is what ir prometh show ati ====================== 20    40 yo female for TH annual f/u visit. Doing well. No new syx over the past year.  No known COVID exposure ----- more or less self quarantined at home - Telehealth --- [employer] [facility] [employer] [facility] [employer] [facility].  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 Clinicals at [place] and [place].  Crohn's is lifelong.    Goal is to keep well. Periodic labs - 6-12 months. Colonoscopy 2 years preventive.  Remicade is tx and if we successfully tx, then we also prevent complications. Changing the natural history.  I reviewed the 7/15/19 colonoscopy with Aliya and it showed redness in the rectum and bx showed inflammation in 3 sites: rectum - minimal active inflammation, cecum- focal active colitis, and ileum showed active enteritis.  I sent Aliya a letter and summarized and suggested/recommended a dose increase of Remicade to 10 mg/kg for a limited time to see if the inflammatin could be better controlled.  She chose not to do that which is fine.  Now, we could recheck labs and also get stool biomarkers and see what inflammation level there is.  She agrees with that plan.   If markers are elevated, she would   Been taking Vit D, 10,000 for 8 months and now less.   =================== 19  37 yo female with Crohn's disease comes in for f/u. Last seen- below- was  and has done great since that time.  Malaika 5 mg/kg q 8 weeks. 18 IFX level was 6.2 8 lab set was nl.   No other CD meds  No pain diarrhea, bleeding.   3 past colonoscopies and one in  showed ileocolitis. Last colon . No FH colon cancer. No FH CD.  Had less that 1 year of pred. Did not get past ordered Dexa.   =========== 18  38 yo female comes in for 4 year f/u with me after seeing Patel Orellana in 2017 and having challenges getting in to see me.  Has done great.  No pain diarrhea or bleeding except for occasional increased syx week 7 before week 8 infusion.  No colonosocpy since .  Labs look great from 2017.  s/p flu shot and pneumovax.  Up to date on TDAP, a 5.    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  GYN is fine with yearly checks.  Ileocolitis by  colonoscopy with ulceration of ileum and half of colon and healed ileum and colon at  colonoscopy but with "scarrring" in colon which likely calls for q 1 (or 2) years.       Review of Systems  ConstitutionalDenies : body aches, fever, weight gain, weight loss  CardiovascularDenies : chest pain, heart racing/skipping, high blood pressure  RespiratoryDenies : chronic cough, shortness of breath, wheezing or asthma symptoms  GastrointestinalDenies : Abdominal Pain/discomfort, Anal/Rectal Pain or Itching, Anal Spasm, Black Stool, Bloating/belching/gaseouness, Change of Bowel Habit, Constipation, Diarrhea/Loose Stool, Difficulty in Swallowing, Heartburn/esophageal reflux, Hemorrhoids, Indigestion, Mucus in Stool, Nausea/vomiting, Rectal Bleeding, Unintentional Weight Loss    Vitals  Date Time BP Position Site L\R Cuff Size HR RR TEMP (F) WT  HT  BMI kg/m2 BSA m2 O2 Sat HC     2020 11:16 AM         126lbs 4oz 5'   24.66 1.56          Assessment  Crohn's Disease     555.2    Plan  OrdersPatient not identified as an unhealthy alcohol user when screene () - - 2020  Influenza immunization administered or previously received () - - 2020  BMI screening above normal () - - 2020  Current tobacco non-user (CAD, cap, COPD, PV) (dm) (IBD) (1036F, ) - - 2020  Colorectal cancer screening results documented and reviewed (PV) (3017F) - - 2020  Documentation of current medications. () - - 2020  CMP (comprehensive metabolic panel) (99337) - - 2020  Sed rate (43335) - - 2020  C-reactive protein (40295) - - 2020  Ferritin assay (46284) - - 2020  Iron + iron binding capacity panel ser/plas (04822, 99551) - - 2020  Vitamin B12 and folate measurement (71960, 40890) - - 2020  CBC with diff (61250) - - 2020  25-OH-Vitamin D (94632) - - 2020  Infliximab Concentration and Anti-Infliximab Antibody (74148) - - 2020  Lactoferrin stl QN (46729) - - 2020  Calprotectin stool (50282) - - 2020  InstructionsPatient seen today via telehealth by agreement and consent of patient in light of current COVID-19 pandemic. I used video conferencing during the visit. The patient encounter is appropriate and reasonable under the circumstances given the patient's particular presentation at this time. The patient has been advised of the followin) the potential risks and limitations of this mode of treatment (including but not limited to the absence of in-person examination); 2) the right to refuse telehealth services at any point without affecting the right to future care; 3) the right to receive in-person services, included immediately after this consultation if an urgent need arises; 4) information, including identifiable images or information from this telehealth consult, will only be shared in accordance with HIPPA regulations. Any and all of the patient's and/or patient's family member's questions on this issue have been answered. The patient has verbally consented to be treated via telehealth services. The patient has also been advised to contact this office for worsening conditions or problems, and seek emergency medical treatment and/or call 911 if the patient deems either necessary.  (For commercial payers, telehealth video only) More than half of the face-to-face time used for counseling and coordination of care.  40 min appointment (74900 EP)  I have documented a list of current medications and reviewed it with the patient.  I encouraged the patient to diet and exercise.    Get trough labs including stool biomarkers including IFX level before next infusion  Then TH visit 2 weeks after to discuss short term increase of Malaika to 10 mg/kg   Exam med 31 min 48627         Electronically Signed by: Rikki Franks MD -Author on May 22, 2020 10:20:34 AM

## 2023-02-06 NOTE — PHYSICAL EXAM RECTAL:
normal tone, no external hemorrhoids, no masses palpable, no red blood, Tenderness on MARQUIS, Internal hemorrhoids present

## 2023-02-13 LAB
A/G RATIO: 1.7
ALBUMIN: 4.5
ALKALINE PHOSPHATASE: 38
ALT (SGPT): 13
ANTI-INFLIXIMAB ANTIBODY: <22
APPEARANCE: CLEAR
AST (SGOT): 14
BACTERIA: (no result)
BASO (ABSOLUTE): 0.1
BASOS: 1
BILIRUBIN, TOTAL: <0.2
BILIRUBIN: NEGATIVE
BUN/CREATININE RATIO: 16
BUN: 10
C-REACTIVE PROTEIN, QUANT: <1
CALCIUM: 9.3
CARBON DIOXIDE, TOTAL: 19
CAST TYPE: (no result)
CASTS: (no result)
CHLORIDE: 103
COMMENT: (no result)
CREATININE: 0.62
CRYSTAL TYPE: (no result)
CRYSTALS: (no result)
EGFR: 114
EOS (ABSOLUTE): 0.2
EOS: 2
EPITHELIAL CELLS (NON RENAL): (no result)
EPITHELIAL CELLS (RENAL): (no result)
FERRITIN, SERUM: 58
FOLATE (FOLIC ACID), SERUM: >20
GLOBULIN, TOTAL: 2.7
GLUCOSE: 74
GLUCOSE: NEGATIVE
HBSAG SCREEN: NEGATIVE
HEMATOCRIT: 41.5
HEMATOLOGY COMMENTS:: (no result)
HEMOGLOBIN: 13.8
HEP B CORE AB, IGM: NEGATIVE
HEP B CORE AB, TOT: NEGATIVE
HEP B SURFACE AB, QUAL: NON REACTIVE
IMMATURE CELLS: (no result)
IMMATURE GRANS (ABS): 0
IMMATURE GRANULOCYTES: 0
INFLIXIMAB DRUG LEVEL: 24
IRON BIND.CAP.(TIBC): 275
IRON SATURATION: 26
IRON: 71
KETONES: NEGATIVE
LYMPHS (ABSOLUTE): 4.1
LYMPHS: 41
Lab: (no result)
MCH: 30.3
MCHC: 33.3
MCV: 91
MICROSCOPIC EXAMINATION: (no result)
MICROSCOPIC EXAMINATION: (no result)
MONOCYTES(ABSOLUTE): 1
MONOCYTES: 10
MUCUS THREADS: (no result)
NEUTROPHILS (ABSOLUTE): 4.6
NEUTROPHILS: 46
NITRITE, URINE: NEGATIVE
NRBC: (no result)
OCCULT BLOOD: NEGATIVE
PH: 5.5
PLATELETS: 336
POTASSIUM: 4.2
PROTEIN, TOTAL: 7.2
PROTEIN: NEGATIVE
QUANTIFERON CRITERIA: (no result)
QUANTIFERON INCUBATION: (no result)
QUANTIFERON MITOGEN VALUE: >10
QUANTIFERON NIL VALUE: 0.02
QUANTIFERON TB1 AG VALUE: 0.03
QUANTIFERON TB2 AG VALUE: 0.04
QUANTIFERON-TB GOLD PLUS: NEGATIVE
RBC: (no result)
RBC: 4.56
RDW: 12.6
SEDIMENTATION RATE-WESTERGREN: 4
SODIUM: 136
SPECIFIC GRAVITY: 1.02
TRICHOMONAS: (no result)
UIBC: 204
URINE-COLOR: YELLOW
UROBILINOGEN,SEMI-QN: 0.2
VITAMIN B12: 611
VITAMIN D, 25-HYDROXY: 48.7
WBC ESTERASE: NEGATIVE
WBC: (no result)
WBC: 10
YEAST: (no result)

## 2023-02-27 ENCOUNTER — OFFICE VISIT (OUTPATIENT)
Dept: URBAN - METROPOLITAN AREA CLINIC 97 | Facility: CLINIC | Age: 43
End: 2023-02-27
Payer: COMMERCIAL

## 2023-02-27 VITALS
SYSTOLIC BLOOD PRESSURE: 106 MMHG | BODY MASS INDEX: 29.84 KG/M2 | RESPIRATION RATE: 16 BRPM | TEMPERATURE: 98.1 F | HEART RATE: 85 BPM | HEIGHT: 60 IN | WEIGHT: 152 LBS | DIASTOLIC BLOOD PRESSURE: 76 MMHG

## 2023-02-27 DIAGNOSIS — K50.80 CROHN'S COLITIS: ICD-10-CM

## 2023-02-27 PROCEDURE — 96413 CHEMO IV INFUSION 1 HR: CPT | Performed by: INTERNAL MEDICINE

## 2023-02-27 RX ORDER — IBUPROFEN 200 MG
TABLET ORAL
Qty: 0 | Refills: 0 | Status: ACTIVE | COMMUNITY
Start: 1900-01-01

## 2023-02-27 RX ORDER — ERGOCALCIFEROL CAPSULES, 1.25 MG/1
1 CAPSULE CAPSULE ORAL
Qty: 12 CAPSULES | Refills: 1 | Status: ACTIVE | COMMUNITY
Start: 2022-09-16 | End: 2023-03-15

## 2023-02-27 RX ORDER — INFLIXIMAB 100 MG/10ML
INFUSE 5 MG/KG OVER NO LESS THAN 2 HOUR(S) BY INTRAVENOUS ROUTE EVERY 8 WEEKS INJECTION, POWDER, LYOPHILIZED, FOR SOLUTION INTRAVENOUS
Qty: 1 | Refills: 0 | Status: ACTIVE | COMMUNITY
Start: 1900-01-01

## 2023-02-28 ENCOUNTER — OFFICE VISIT (OUTPATIENT)
Dept: URBAN - METROPOLITAN AREA SURGERY CENTER 18 | Facility: SURGERY CENTER | Age: 43
End: 2023-02-28

## 2023-03-15 ENCOUNTER — TELEPHONE ENCOUNTER (OUTPATIENT)
Dept: URBAN - METROPOLITAN AREA CLINIC 98 | Facility: CLINIC | Age: 43
End: 2023-03-15

## 2023-03-15 RX ORDER — SODIUM, POTASSIUM,MAG SULFATES 17.5-3.13G
177ML SOLUTION, RECONSTITUTED, ORAL ORAL
Qty: 354 ML | Refills: 0 | OUTPATIENT
Start: 2023-03-16 | End: 2023-03-17

## 2023-03-23 ENCOUNTER — CLAIMS CREATED FROM THE CLAIM WINDOW (OUTPATIENT)
Dept: URBAN - METROPOLITAN AREA SURGERY CENTER 18 | Facility: SURGERY CENTER | Age: 43
End: 2023-03-23

## 2023-03-23 ENCOUNTER — CLAIMS CREATED FROM THE CLAIM WINDOW (OUTPATIENT)
Dept: URBAN - METROPOLITAN AREA SURGERY CENTER 18 | Facility: SURGERY CENTER | Age: 43
End: 2023-03-23
Payer: COMMERCIAL

## 2023-03-23 ENCOUNTER — CLAIMS CREATED FROM THE CLAIM WINDOW (OUTPATIENT)
Dept: URBAN - METROPOLITAN AREA CLINIC 4 | Facility: CLINIC | Age: 43
End: 2023-03-23
Payer: COMMERCIAL

## 2023-03-23 DIAGNOSIS — K29.80 ACUTE DUODENITIS: ICD-10-CM

## 2023-03-23 DIAGNOSIS — K31.89 ACQUIRED DEFORMITY OF DUODENUM: ICD-10-CM

## 2023-03-23 DIAGNOSIS — K31.89 OTHER DISEASES OF STOMACH AND DUODENUM: ICD-10-CM

## 2023-03-23 DIAGNOSIS — K29.70 GASTRITIS, UNSPECIFIED, WITHOUT BLEEDING: ICD-10-CM

## 2023-03-23 DIAGNOSIS — K50.00 CICATRIZING ENTEROCOLITIS: ICD-10-CM

## 2023-03-23 DIAGNOSIS — K21.9 ACID REFLUX: ICD-10-CM

## 2023-03-23 PROCEDURE — 88305 TISSUE EXAM BY PATHOLOGIST: CPT | Performed by: PATHOLOGY

## 2023-03-23 PROCEDURE — 88312 SPECIAL STAINS GROUP 1: CPT | Performed by: PATHOLOGY

## 2023-03-23 PROCEDURE — 45380 COLONOSCOPY AND BIOPSY: CPT | Performed by: INTERNAL MEDICINE

## 2023-03-23 PROCEDURE — 43239 EGD BIOPSY SINGLE/MULTIPLE: CPT | Performed by: INTERNAL MEDICINE

## 2023-03-23 PROCEDURE — G8907 PT DOC NO EVENTS ON DISCHARG: HCPCS | Performed by: INTERNAL MEDICINE

## 2023-03-23 RX ORDER — INFLIXIMAB 100 MG/10ML
INFUSE 5 MG/KG OVER NO LESS THAN 2 HOUR(S) BY INTRAVENOUS ROUTE EVERY 8 WEEKS INJECTION, POWDER, LYOPHILIZED, FOR SOLUTION INTRAVENOUS
Qty: 1 | Refills: 0 | Status: ACTIVE | COMMUNITY
Start: 1900-01-01

## 2023-03-23 RX ORDER — IBUPROFEN 200 MG
TABLET ORAL
Qty: 0 | Refills: 0 | Status: ACTIVE | COMMUNITY
Start: 1900-01-01

## 2023-04-24 ENCOUNTER — OFFICE VISIT (OUTPATIENT)
Dept: URBAN - METROPOLITAN AREA CLINIC 97 | Facility: CLINIC | Age: 43
End: 2023-04-24
Payer: COMMERCIAL

## 2023-04-24 VITALS
HEART RATE: 86 BPM | WEIGHT: 154 LBS | RESPIRATION RATE: 16 BRPM | HEIGHT: 60 IN | BODY MASS INDEX: 30.23 KG/M2 | SYSTOLIC BLOOD PRESSURE: 122 MMHG | TEMPERATURE: 97.9 F | DIASTOLIC BLOOD PRESSURE: 76 MMHG

## 2023-04-24 DIAGNOSIS — K50.80 CROHN'S COLITIS: ICD-10-CM

## 2023-04-24 PROCEDURE — 96413 CHEMO IV INFUSION 1 HR: CPT | Performed by: INTERNAL MEDICINE

## 2023-04-24 RX ORDER — INFLIXIMAB 100 MG/10ML
INFUSE 5 MG/KG OVER NO LESS THAN 2 HOUR(S) BY INTRAVENOUS ROUTE EVERY 8 WEEKS INJECTION, POWDER, LYOPHILIZED, FOR SOLUTION INTRAVENOUS
Qty: 1 | Refills: 0 | Status: ACTIVE | COMMUNITY
Start: 1900-01-01

## 2023-04-24 RX ORDER — IBUPROFEN 200 MG
TABLET ORAL
Qty: 0 | Refills: 0 | Status: ACTIVE | COMMUNITY
Start: 1900-01-01

## 2023-06-19 ENCOUNTER — OFFICE VISIT (OUTPATIENT)
Dept: URBAN - METROPOLITAN AREA CLINIC 97 | Facility: CLINIC | Age: 43
End: 2023-06-19

## 2023-06-21 ENCOUNTER — OFFICE VISIT (OUTPATIENT)
Dept: URBAN - METROPOLITAN AREA CLINIC 97 | Facility: CLINIC | Age: 43
End: 2023-06-21
Payer: COMMERCIAL

## 2023-06-21 VITALS
RESPIRATION RATE: 18 BRPM | BODY MASS INDEX: 30.08 KG/M2 | TEMPERATURE: 97.3 F | HEART RATE: 83 BPM | WEIGHT: 153.2 LBS | SYSTOLIC BLOOD PRESSURE: 112 MMHG | DIASTOLIC BLOOD PRESSURE: 77 MMHG | HEIGHT: 60 IN

## 2023-06-21 DIAGNOSIS — K50.80 CROHN'S COLITIS: ICD-10-CM

## 2023-06-21 PROCEDURE — 96413 CHEMO IV INFUSION 1 HR: CPT | Performed by: INTERNAL MEDICINE

## 2023-06-21 RX ORDER — INFLIXIMAB 100 MG/10ML
INFUSE 5 MG/KG OVER NO LESS THAN 2 HOUR(S) BY INTRAVENOUS ROUTE EVERY 8 WEEKS INJECTION, POWDER, LYOPHILIZED, FOR SOLUTION INTRAVENOUS
Qty: 1 | Refills: 0 | Status: ACTIVE | COMMUNITY
Start: 1900-01-01

## 2023-06-21 RX ORDER — IBUPROFEN 200 MG
TABLET ORAL
Qty: 0 | Refills: 0 | Status: ACTIVE | COMMUNITY
Start: 1900-01-01

## 2023-07-27 ENCOUNTER — TELEPHONE ENCOUNTER (OUTPATIENT)
Dept: URBAN - METROPOLITAN AREA CLINIC 97 | Facility: CLINIC | Age: 43
End: 2023-07-27

## 2023-07-27 RX ORDER — INFLIXIMAB 100 MG/10ML
INFUSE 5 MG/KG INJECTION, POWDER, LYOPHILIZED, FOR SOLUTION INTRAVENOUS
Qty: 400 EACH | Refills: 9

## 2023-08-11 ENCOUNTER — OFFICE VISIT (OUTPATIENT)
Dept: URBAN - METROPOLITAN AREA CLINIC 97 | Facility: CLINIC | Age: 43
End: 2023-08-11
Payer: COMMERCIAL

## 2023-08-11 VITALS
RESPIRATION RATE: 18 BRPM | WEIGHT: 158 LBS | BODY MASS INDEX: 31.02 KG/M2 | TEMPERATURE: 98.1 F | HEART RATE: 96 BPM | DIASTOLIC BLOOD PRESSURE: 77 MMHG | SYSTOLIC BLOOD PRESSURE: 134 MMHG | HEIGHT: 60 IN

## 2023-08-11 DIAGNOSIS — K50.80 CROHN'S COLITIS: ICD-10-CM

## 2023-08-11 PROCEDURE — 96413 CHEMO IV INFUSION 1 HR: CPT | Performed by: INTERNAL MEDICINE

## 2023-08-11 RX ORDER — IBUPROFEN 200 MG
TABLET ORAL
Qty: 0 | Refills: 0 | Status: ACTIVE | COMMUNITY
Start: 1900-01-01

## 2023-08-11 RX ORDER — INFLIXIMAB 100 MG/10ML
INFUSE 5 MG/KG INJECTION, POWDER, LYOPHILIZED, FOR SOLUTION INTRAVENOUS
Qty: 400 EACH | Refills: 9 | Status: ACTIVE | COMMUNITY

## 2023-10-06 ENCOUNTER — OFFICE VISIT (OUTPATIENT)
Dept: URBAN - METROPOLITAN AREA CLINIC 97 | Facility: CLINIC | Age: 43
End: 2023-10-06
Payer: COMMERCIAL

## 2023-10-06 VITALS
HEART RATE: 90 BPM | SYSTOLIC BLOOD PRESSURE: 116 MMHG | HEIGHT: 60 IN | TEMPERATURE: 96.2 F | DIASTOLIC BLOOD PRESSURE: 73 MMHG | WEIGHT: 155.6 LBS | RESPIRATION RATE: 18 BRPM | BODY MASS INDEX: 30.55 KG/M2

## 2023-10-06 DIAGNOSIS — K50.80 CROHN'S COLITIS: ICD-10-CM

## 2023-10-06 PROCEDURE — 96413 CHEMO IV INFUSION 1 HR: CPT | Performed by: INTERNAL MEDICINE

## 2023-10-06 RX ORDER — INFLIXIMAB 100 MG/10ML
INFUSE 5 MG/KG INJECTION, POWDER, LYOPHILIZED, FOR SOLUTION INTRAVENOUS
Qty: 400 EACH | Refills: 9 | Status: ACTIVE | COMMUNITY

## 2023-10-06 RX ORDER — IBUPROFEN 200 MG
TABLET ORAL
Qty: 0 | Refills: 0 | Status: ACTIVE | COMMUNITY
Start: 1900-01-01

## 2023-11-28 ENCOUNTER — OFFICE VISIT (OUTPATIENT)
Dept: URBAN - METROPOLITAN AREA CLINIC 97 | Facility: CLINIC | Age: 43
End: 2023-11-28
Payer: COMMERCIAL

## 2023-11-28 VITALS
WEIGHT: 157 LBS | DIASTOLIC BLOOD PRESSURE: 69 MMHG | HEART RATE: 84 BPM | RESPIRATION RATE: 18 BRPM | HEIGHT: 60 IN | SYSTOLIC BLOOD PRESSURE: 108 MMHG | TEMPERATURE: 97 F | BODY MASS INDEX: 30.82 KG/M2

## 2023-11-28 DIAGNOSIS — K50.80 CROHN'S COLITIS: ICD-10-CM

## 2023-11-28 PROCEDURE — 96413 CHEMO IV INFUSION 1 HR: CPT | Performed by: INTERNAL MEDICINE

## 2023-11-28 RX ORDER — IBUPROFEN 200 MG
TABLET ORAL
Qty: 0 | Refills: 0 | Status: ACTIVE | COMMUNITY
Start: 1900-01-01

## 2023-11-28 RX ORDER — INFLIXIMAB 100 MG/10ML
INFUSE 5 MG/KG INJECTION, POWDER, LYOPHILIZED, FOR SOLUTION INTRAVENOUS
Qty: 400 EACH | Refills: 9 | Status: ACTIVE | COMMUNITY

## 2024-01-23 ENCOUNTER — OFFICE VISIT (OUTPATIENT)
Dept: URBAN - METROPOLITAN AREA CLINIC 97 | Facility: CLINIC | Age: 44
End: 2024-01-23

## 2024-01-26 ENCOUNTER — OFFICE VISIT (OUTPATIENT)
Dept: URBAN - METROPOLITAN AREA CLINIC 97 | Facility: CLINIC | Age: 44
End: 2024-01-26

## 2024-01-29 ENCOUNTER — OFFICE VISIT (OUTPATIENT)
Dept: URBAN - METROPOLITAN AREA CLINIC 97 | Facility: CLINIC | Age: 44
End: 2024-01-29
Payer: COMMERCIAL

## 2024-01-29 VITALS
HEIGHT: 60 IN | SYSTOLIC BLOOD PRESSURE: 109 MMHG | BODY MASS INDEX: 30.82 KG/M2 | WEIGHT: 157 LBS | RESPIRATION RATE: 18 BRPM | DIASTOLIC BLOOD PRESSURE: 70 MMHG | HEART RATE: 82 BPM | TEMPERATURE: 96 F

## 2024-01-29 DIAGNOSIS — K50.80 CROHN'S COLITIS: ICD-10-CM

## 2024-01-29 PROCEDURE — 96413 CHEMO IV INFUSION 1 HR: CPT | Performed by: INTERNAL MEDICINE

## 2024-01-29 RX ORDER — IBUPROFEN 200 MG
TABLET ORAL
Qty: 0 | Refills: 0 | Status: ACTIVE | COMMUNITY
Start: 1900-01-01

## 2024-01-29 RX ORDER — INFLIXIMAB 100 MG/10ML
INFUSE 5 MG/KG INJECTION, POWDER, LYOPHILIZED, FOR SOLUTION INTRAVENOUS
Qty: 400 EACH | Refills: 9 | Status: ACTIVE | COMMUNITY

## 2024-03-22 ENCOUNTER — REM (OUTPATIENT)
Dept: URBAN - METROPOLITAN AREA CLINIC 97 | Facility: CLINIC | Age: 44
End: 2024-03-22
Payer: COMMERCIAL

## 2024-03-22 VITALS
HEART RATE: 98 BPM | HEIGHT: 60 IN | SYSTOLIC BLOOD PRESSURE: 123 MMHG | TEMPERATURE: 96.4 F | WEIGHT: 159 LBS | BODY MASS INDEX: 31.22 KG/M2 | RESPIRATION RATE: 18 BRPM | DIASTOLIC BLOOD PRESSURE: 73 MMHG

## 2024-03-22 DIAGNOSIS — K50.80 CROHN'S COLITIS: ICD-10-CM

## 2024-03-22 PROCEDURE — 96413 CHEMO IV INFUSION 1 HR: CPT | Performed by: INTERNAL MEDICINE

## 2024-03-22 RX ORDER — INFLIXIMAB 100 MG/10ML
INFUSE 5 MG/KG INJECTION, POWDER, LYOPHILIZED, FOR SOLUTION INTRAVENOUS
Qty: 400 EACH | Refills: 9 | Status: ACTIVE | COMMUNITY

## 2024-03-22 RX ORDER — IBUPROFEN 200 MG
TABLET ORAL
Qty: 0 | Refills: 0 | Status: ACTIVE | COMMUNITY
Start: 1900-01-01

## 2024-04-04 ENCOUNTER — OV EP (OUTPATIENT)
Dept: URBAN - METROPOLITAN AREA CLINIC 98 | Facility: CLINIC | Age: 44
End: 2024-04-04
Payer: COMMERCIAL

## 2024-04-04 VITALS
HEART RATE: 93 BPM | HEIGHT: 60 IN | SYSTOLIC BLOOD PRESSURE: 121 MMHG | DIASTOLIC BLOOD PRESSURE: 80 MMHG | WEIGHT: 160 LBS | BODY MASS INDEX: 31.41 KG/M2 | TEMPERATURE: 97 F

## 2024-04-04 DIAGNOSIS — K50.90 CROHN'S DISEASE: ICD-10-CM

## 2024-04-04 DIAGNOSIS — D84.9 IMMUNOSUPPRESSION: ICD-10-CM

## 2024-04-04 DIAGNOSIS — K50.80 CROHN'S COLITIS: ICD-10-CM

## 2024-04-04 PROCEDURE — 99214 OFFICE O/P EST MOD 30 MIN: CPT | Performed by: INTERNAL MEDICINE

## 2024-04-04 RX ORDER — INFLIXIMAB 100 MG/10ML
INFUSE 5 MG/KG INJECTION, POWDER, LYOPHILIZED, FOR SOLUTION INTRAVENOUS
Qty: 400 EACH | Refills: 9 | Status: ACTIVE | COMMUNITY

## 2024-04-04 RX ORDER — IBUPROFEN 200 MG
TABLET ORAL
Qty: 0 | Refills: 0 | Status: ACTIVE | COMMUNITY
Start: 1900-01-01

## 2024-05-16 ENCOUNTER — OFFICE VISIT (OUTPATIENT)
Dept: URBAN - METROPOLITAN AREA CLINIC 97 | Facility: CLINIC | Age: 44
End: 2024-05-16
Payer: COMMERCIAL

## 2024-05-16 VITALS
HEART RATE: 80 BPM | DIASTOLIC BLOOD PRESSURE: 74 MMHG | RESPIRATION RATE: 18 BRPM | SYSTOLIC BLOOD PRESSURE: 115 MMHG | WEIGHT: 158.8 LBS | TEMPERATURE: 98.1 F | BODY MASS INDEX: 31.18 KG/M2 | HEIGHT: 60 IN

## 2024-05-16 DIAGNOSIS — K50.80 CROHN'S COLITIS: ICD-10-CM

## 2024-05-16 PROCEDURE — 96413 CHEMO IV INFUSION 1 HR: CPT | Performed by: INTERNAL MEDICINE

## 2024-05-16 RX ORDER — IBUPROFEN 200 MG
TABLET ORAL
Qty: 0 | Refills: 0 | Status: ACTIVE | COMMUNITY
Start: 1900-01-01

## 2024-05-16 RX ORDER — INFLIXIMAB 100 MG/10ML
INFUSE 5 MG/KG INJECTION, POWDER, LYOPHILIZED, FOR SOLUTION INTRAVENOUS
Qty: 400 EACH | Refills: 9 | Status: ACTIVE | COMMUNITY

## 2024-05-17 ENCOUNTER — TELEPHONE ENCOUNTER (OUTPATIENT)
Dept: URBAN - METROPOLITAN AREA CLINIC 92 | Facility: CLINIC | Age: 44
End: 2024-05-17

## 2024-05-17 RX ORDER — INFLIXIMAB 100 MG/10ML
AS DIRECTED INJECTION, POWDER, LYOPHILIZED, FOR SOLUTION INTRAVENOUS
OUTPATIENT
Start: 2024-05-18

## 2024-05-17 RX ORDER — INFLIXIMAB 100 MG/10ML
INFUSE 5 MG/KG INJECTION, POWDER, LYOPHILIZED, FOR SOLUTION INTRAVENOUS
Qty: 400 EACH | Refills: 9
End: 2025-11-28

## 2024-07-08 ENCOUNTER — OFFICE VISIT (OUTPATIENT)
Dept: URBAN - METROPOLITAN AREA CLINIC 97 | Facility: CLINIC | Age: 44
End: 2024-07-08
Payer: COMMERCIAL

## 2024-07-08 VITALS
HEIGHT: 60 IN | DIASTOLIC BLOOD PRESSURE: 77 MMHG | BODY MASS INDEX: 31.22 KG/M2 | WEIGHT: 159 LBS | RESPIRATION RATE: 18 BRPM | TEMPERATURE: 98.2 F | HEART RATE: 102 BPM | SYSTOLIC BLOOD PRESSURE: 113 MMHG

## 2024-07-08 DIAGNOSIS — K50.80 CROHN'S COLITIS: ICD-10-CM

## 2024-07-08 PROCEDURE — 96413 CHEMO IV INFUSION 1 HR: CPT | Performed by: INTERNAL MEDICINE

## 2024-07-08 RX ORDER — INFLIXIMAB 100 MG/10ML
AS DIRECTED INJECTION, POWDER, LYOPHILIZED, FOR SOLUTION INTRAVENOUS
Status: ACTIVE | COMMUNITY
Start: 2024-05-18

## 2024-07-08 RX ORDER — INFLIXIMAB 100 MG/10ML
INFUSE 5 MG/KG INJECTION, POWDER, LYOPHILIZED, FOR SOLUTION INTRAVENOUS
Qty: 400 EACH | Refills: 9 | Status: ACTIVE | COMMUNITY
End: 2025-11-28

## 2024-07-08 RX ORDER — IBUPROFEN 200 MG
TABLET ORAL
Qty: 0 | Refills: 0 | Status: ACTIVE | COMMUNITY
Start: 1900-01-01

## 2024-09-03 ENCOUNTER — OFFICE VISIT (OUTPATIENT)
Dept: URBAN - METROPOLITAN AREA CLINIC 97 | Facility: CLINIC | Age: 44
End: 2024-09-03
Payer: COMMERCIAL

## 2024-09-03 VITALS
TEMPERATURE: 98.3 F | RESPIRATION RATE: 20 BRPM | SYSTOLIC BLOOD PRESSURE: 105 MMHG | HEART RATE: 99 BPM | DIASTOLIC BLOOD PRESSURE: 75 MMHG | HEIGHT: 60 IN | BODY MASS INDEX: 31.02 KG/M2 | WEIGHT: 158 LBS

## 2024-09-03 DIAGNOSIS — K50.80 CROHN'S COLITIS: ICD-10-CM

## 2024-09-03 PROCEDURE — 96413 CHEMO IV INFUSION 1 HR: CPT | Performed by: INTERNAL MEDICINE

## 2024-09-03 RX ORDER — INFLIXIMAB 100 MG/10ML
INFUSE 5 MG/KG INJECTION, POWDER, LYOPHILIZED, FOR SOLUTION INTRAVENOUS
Qty: 400 EACH | Refills: 9 | Status: ACTIVE | COMMUNITY
End: 2025-11-28

## 2024-09-03 RX ORDER — INFLIXIMAB 100 MG/10ML
AS DIRECTED INJECTION, POWDER, LYOPHILIZED, FOR SOLUTION INTRAVENOUS
Status: ACTIVE | COMMUNITY
Start: 2024-05-18

## 2024-09-03 RX ORDER — IBUPROFEN 200 MG
TABLET ORAL
Qty: 0 | Refills: 0 | Status: ACTIVE | COMMUNITY
Start: 1900-01-01

## 2024-10-29 ENCOUNTER — OFFICE VISIT (OUTPATIENT)
Dept: URBAN - METROPOLITAN AREA CLINIC 97 | Facility: CLINIC | Age: 44
End: 2024-10-29
Payer: COMMERCIAL

## 2024-10-29 VITALS
HEART RATE: 94 BPM | SYSTOLIC BLOOD PRESSURE: 116 MMHG | WEIGHT: 145.2 LBS | HEIGHT: 60 IN | DIASTOLIC BLOOD PRESSURE: 73 MMHG | BODY MASS INDEX: 28.51 KG/M2 | TEMPERATURE: 98.4 F | RESPIRATION RATE: 15 BRPM

## 2024-10-29 DIAGNOSIS — K50.90 CROHN'S DISEASE: ICD-10-CM

## 2024-10-29 PROCEDURE — 96413 CHEMO IV INFUSION 1 HR: CPT | Performed by: INTERNAL MEDICINE

## 2024-10-29 RX ORDER — INFLIXIMAB 100 MG/10ML
INFUSE 5 MG/KG INJECTION, POWDER, LYOPHILIZED, FOR SOLUTION INTRAVENOUS
Qty: 400 EACH | Refills: 9 | Status: ACTIVE | COMMUNITY
End: 2025-11-28

## 2024-10-29 RX ORDER — IBUPROFEN 200 MG
TABLET ORAL
Qty: 0 | Refills: 0 | Status: ACTIVE | COMMUNITY
Start: 1900-01-01

## 2024-10-29 RX ORDER — INFLIXIMAB 100 MG/10ML
AS DIRECTED INJECTION, POWDER, LYOPHILIZED, FOR SOLUTION INTRAVENOUS
Status: ACTIVE | COMMUNITY
Start: 2024-05-18

## 2024-12-23 ENCOUNTER — OFFICE VISIT (OUTPATIENT)
Dept: URBAN - METROPOLITAN AREA CLINIC 97 | Facility: CLINIC | Age: 44
End: 2024-12-23
Payer: COMMERCIAL

## 2024-12-23 VITALS
HEART RATE: 103 BPM | RESPIRATION RATE: 18 BRPM | BODY MASS INDEX: 26.11 KG/M2 | WEIGHT: 133 LBS | TEMPERATURE: 98.4 F | DIASTOLIC BLOOD PRESSURE: 70 MMHG | SYSTOLIC BLOOD PRESSURE: 106 MMHG | HEIGHT: 60 IN

## 2024-12-23 DIAGNOSIS — K50.90 CROHN'S DISEASE: ICD-10-CM

## 2024-12-23 PROCEDURE — 96413 CHEMO IV INFUSION 1 HR: CPT | Performed by: INTERNAL MEDICINE

## 2024-12-23 RX ORDER — IBUPROFEN 200 MG
TABLET ORAL
Qty: 0 | Refills: 0 | Status: ACTIVE | COMMUNITY
Start: 1900-01-01

## 2024-12-23 RX ORDER — INFLIXIMAB 100 MG/10ML
AS DIRECTED INJECTION, POWDER, LYOPHILIZED, FOR SOLUTION INTRAVENOUS
Status: ACTIVE | COMMUNITY
Start: 2024-05-18

## 2024-12-23 RX ORDER — INFLIXIMAB 100 MG/10ML
INFUSE 5 MG/KG INJECTION, POWDER, LYOPHILIZED, FOR SOLUTION INTRAVENOUS
Qty: 400 EACH | Refills: 9 | Status: ACTIVE | COMMUNITY
End: 2025-11-28

## 2025-02-13 ENCOUNTER — TELEPHONE ENCOUNTER (OUTPATIENT)
Dept: URBAN - METROPOLITAN AREA CLINIC 6 | Facility: CLINIC | Age: 45
End: 2025-02-13

## 2025-02-17 ENCOUNTER — OFFICE VISIT (OUTPATIENT)
Dept: URBAN - METROPOLITAN AREA CLINIC 97 | Facility: CLINIC | Age: 45
End: 2025-02-17

## 2025-02-18 ENCOUNTER — OFFICE VISIT (OUTPATIENT)
Dept: URBAN - METROPOLITAN AREA CLINIC 97 | Facility: CLINIC | Age: 45
End: 2025-02-18
Payer: COMMERCIAL

## 2025-02-18 VITALS
RESPIRATION RATE: 18 BRPM | HEIGHT: 60 IN | HEART RATE: 95 BPM | DIASTOLIC BLOOD PRESSURE: 77 MMHG | WEIGHT: 124 LBS | BODY MASS INDEX: 24.35 KG/M2 | TEMPERATURE: 98.6 F | SYSTOLIC BLOOD PRESSURE: 117 MMHG

## 2025-02-18 DIAGNOSIS — K50.90 CROHN'S DISEASE: ICD-10-CM

## 2025-02-18 PROCEDURE — 96413 CHEMO IV INFUSION 1 HR: CPT | Performed by: INTERNAL MEDICINE

## 2025-02-18 RX ORDER — INFLIXIMAB 100 MG/10ML
INFUSE 5 MG/KG INJECTION, POWDER, LYOPHILIZED, FOR SOLUTION INTRAVENOUS
Qty: 400 EACH | Refills: 9 | Status: ACTIVE | COMMUNITY
End: 2025-11-28

## 2025-02-18 RX ORDER — IBUPROFEN 200 MG
TABLET ORAL
Qty: 0 | Refills: 0 | Status: ACTIVE | COMMUNITY
Start: 1900-01-01

## 2025-02-18 RX ORDER — INFLIXIMAB 100 MG/10ML
AS DIRECTED INJECTION, POWDER, LYOPHILIZED, FOR SOLUTION INTRAVENOUS
Status: ACTIVE | COMMUNITY
Start: 2024-05-18

## 2025-02-19 LAB
Lab: (no result)
QUANTIFERON CRITERIA: (no result)
QUANTIFERON INCUBATION: (no result)
QUANTIFERON MITOGEN VALUE: >10
QUANTIFERON NIL VALUE: 0.05
QUANTIFERON TB1 AG VALUE: 0.04
QUANTIFERON TB2 AG VALUE: 0.07
QUANTIFERON-TB GOLD PLUS: NEGATIVE

## 2025-04-14 ENCOUNTER — OFFICE VISIT (OUTPATIENT)
Dept: URBAN - METROPOLITAN AREA CLINIC 97 | Facility: CLINIC | Age: 45
End: 2025-04-14
Payer: COMMERCIAL

## 2025-04-14 DIAGNOSIS — K50.90 CROHN'S DISEASE: ICD-10-CM

## 2025-04-14 PROCEDURE — 96413 CHEMO IV INFUSION 1 HR: CPT | Performed by: INTERNAL MEDICINE

## 2025-04-14 RX ORDER — INFLIXIMAB 100 MG/10ML
INFUSE 5 MG/KG INJECTION, POWDER, LYOPHILIZED, FOR SOLUTION INTRAVENOUS
Qty: 400 EACH | Refills: 9 | Status: ACTIVE | COMMUNITY
End: 2025-11-28

## 2025-04-14 RX ORDER — INFLIXIMAB 100 MG/10ML
AS DIRECTED INJECTION, POWDER, LYOPHILIZED, FOR SOLUTION INTRAVENOUS
Status: ACTIVE | COMMUNITY
Start: 2024-05-18

## 2025-04-14 RX ORDER — IBUPROFEN 200 MG
TABLET ORAL
Qty: 0 | Refills: 0 | Status: ACTIVE | COMMUNITY
Start: 1900-01-01

## 2025-04-18 ENCOUNTER — DASHBOARD ENCOUNTERS (OUTPATIENT)
Age: 45
End: 2025-04-18

## 2025-04-21 ENCOUNTER — LAB OUTSIDE AN ENCOUNTER (OUTPATIENT)
Dept: URBAN - METROPOLITAN AREA CLINIC 98 | Facility: CLINIC | Age: 45
End: 2025-04-21

## 2025-04-21 ENCOUNTER — OFFICE VISIT (OUTPATIENT)
Dept: URBAN - METROPOLITAN AREA CLINIC 98 | Facility: CLINIC | Age: 45
End: 2025-04-21
Payer: COMMERCIAL

## 2025-04-21 ENCOUNTER — TELEPHONE ENCOUNTER (OUTPATIENT)
Dept: URBAN - METROPOLITAN AREA CLINIC 98 | Facility: CLINIC | Age: 45
End: 2025-04-21

## 2025-04-21 DIAGNOSIS — K50.90 CROHN'S DISEASE: ICD-10-CM

## 2025-04-21 DIAGNOSIS — D84.9 IMMUNOSUPPRESSION: ICD-10-CM

## 2025-04-21 DIAGNOSIS — K50.80 CROHN'S COLITIS: ICD-10-CM

## 2025-04-21 PROCEDURE — 99215 OFFICE O/P EST HI 40 MIN: CPT | Performed by: INTERNAL MEDICINE

## 2025-04-21 RX ORDER — INFLIXIMAB 100 MG/10ML
AS DIRECTED INJECTION, POWDER, LYOPHILIZED, FOR SOLUTION INTRAVENOUS
Status: ACTIVE | COMMUNITY
Start: 2024-05-18

## 2025-04-21 RX ORDER — TIRZEPATIDE 7.5 MG/.5ML
0.5 ML INJECTION, SOLUTION SUBCUTANEOUS DAILY
Status: ACTIVE | COMMUNITY

## 2025-04-21 RX ORDER — IBUPROFEN 200 MG
TABLET ORAL
Qty: 0 | Refills: 0 | Status: ACTIVE | COMMUNITY
Start: 1900-01-01

## 2025-04-21 NOTE — HPI-TODAY'S VISIT:
No pain diarrhea or bleeding, and zepbound has made CD syx better  Last colonoscopy 2 years ago 323/23 and showed nl bx, and egd showed healing erosion and so will repeat both.  Started Zepbound Oct 2024, and has lost 45 lb. At higherst dose, too much N/V Was up to 5mg a week and now at 2.5 mg qow and doing well  Getting Remicade brand 5 mg/kg q 8 weeks and all going well.  Past significant steroid use, - or so t  Up to date on vax  Needs Shingles and Pneumonia vaccine, verify with primary.      ========================== 24  42 yo female with CD comes in for 14 month f/u.  Doing better than a year ago.  Resumed Remicade from inflectra after last visit.  Getting infusions 5 mg/kg q 8 weeks - 1 hour. Infusion experience is good but not the same. No pain, diarrhea or bleeding. Last infusion was not as good. 1-2 weeks. Has abdominal pain, diarrhea and flatulence and joint pain. Syx did not improve as they usually. Wonders if there is something to do when she travels.  Pre-travel she gets stress and diarrhea and is taking Immodium which is fine. Recent quantiferon at [facility] was nl and labs from 2023 were wnl. 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  Due for re-authorization.  Still not doing as well on Remicade secondary to prior switch to inflectra and now s/p 2 doses of REmicade.  Crampy pain that she had not had in years, after eating and other times, especially at night and may keep her from sleeping.  Diarrhea: no diarrhea pre inflectra, and now "it is so weird" and may vomit and have diarrhea. Since Nov, 3 vomiting. Vdhrow21 times.  Aliya is still benefitting from Remicade//infliximab as she has no ER visits and benefits from the infusion, but there is more and more breakthrough.  She is still getting 5 mg/kg so maybe she needs dose upward adjustment.  She has new upper symptoms/heartburn and burning - so needs an egd. ======================================  22  40 yo female with CD dx . Been on Malaika since 2008.  At last TH visit 20 Aliya was doing well and on Lab dav assay had ifx level of 7 and low grade ati. Repeat as [facility] showed no ati.  Had last dose of Remicade 1st quarter 2022 and now has had 2 doses of inflectra. She does not think the inflectra. Next infusion is   Has had standard dose Malaika 5mg /kg q 8 weeks and no pain diarrhea or bleeding.  Since starting inflectra she has had increased syx of pain and diarrhea as well as joint pain, now 4 weeks before the next infusion. Now has new rectal bleeding.    ======================= 20  39 yo female with CD. Doing well. Birthday. Staying. Remicade infusions going well. Had a 2-3 day flare before the last infusion. Had some this week.  Labs dated 10/23/20 showed IFX level of 7 and ati of 23 and will repeat with prometheus. Generally no pain diarrhea or bleeding. AGWS. No fcs. No EIM No covid  Only question is what ir prometh show ati ====================== 20   38 yo female for TH annual f/u visit. Doing well. No new syx over the past year.  No known COVID exposure ----- more or less self quarantined at home - Telehealth --- [employer] [facility]. 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 Clinicals at [place] and [place].  Crohn's is lifelong. Goal is to keep well. Periodic labs - 6-12 months. Colonoscopy 2 years preventive.  Remicade is tx and if we successfully tx, then we also prevent complications. Changing the natural history.  I reviewed the 7/15/19 colonoscopy with Aliya and it showed redness in the rectum and bx showed inflammation in 3 sites: rectum - minimal active inflammation, cecum- focal active colitis, and ileum showed active enteritis.  I sent Aliya a letter and summarized and suggested/recommended a dose increase of Remicade to 10 mg/kg for a limited time to see if the inflammatin could be better controlled. She chose not to do that which is fine.  Now, we could recheck labs and also get stool biomarkers and see what inflammation level there is.  She agrees with that plan. If markers are elevated, she would   Been taking Vit D, 10,000 for 8 months and now less.   =================== 19  37 yo female with Crohn's disease comes in for f/u. Last seen- below- was  and has done great since that time.  Malaika 5 mg/kg q 8 weeks. 18 IFX level was 6.2 8 lab set was nl.   No other CD meds  No pain diarrhea, bleeding.   3 past colonoscopies and one in  showed ileocolitis. Last colon . No FH colon cancer. No FH CD.  Had less that 1 year of pred. Did not get past ordered Dexa.   =========== 18  36 yo female comes in for 4 year f/u with me after seeing Patel Orellana in 2017 and having challenges getting in to see me.  Has done great.  No pain diarrhea or bleeding except for occasional increased syx week 7 before week 8 infusion.  No colonosocpy since .  Labs look great from 2017.  s/p flu shot and pneumovax.  Up to date on TDAP, a 5.  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  GYN is fine with yearly checks.  Ileocolitis by  colonoscopy with ulceration of ileum and half of colon and healed ileum and colon at  colonoscopy but with "scarrring" in colon which likely calls for q 1 (or 2) years.      Review of Systems ConstitutionalDenies : body aches, fever, weight gain, weight loss CardiovascularDenies : chest pain, heart racing/skipping, high blood pressure RespiratoryDenies : chronic cough, shortness of breath, wheezing or asthma symptoms GastrointestinalDenies : Abdominal Pain/discomfort, Anal/Rectal Pain or Itching, Anal Spasm, Black Stool, Bloating/belching/gaseouness, Change of Bowel Habit, Constipation, Diarrhea/Loose Stool, Difficulty in Swallowing, Heartburn/esophageal reflux, Hemorrhoids, Indigestion, Mucus in Stool, Nausea/vomiting, Rectal Bleeding, Unintentional Weight Loss   Vitals Date Time BP Position Site L\R Cuff Size HR RR TEMP (F) WT HT BMI kg/m2 BSA m2 O2 Sat HC  2020 11:16 AM 126lbs 4oz 5' 24.66 1.56       Assessment Crohn's Disease 555.2    Plan OrdersPatient not identified as an unhealthy alcohol user when screene () - - 2020 Influenza immunization administered or previously received () - - 2020 BMI screening above normal () - - 2020 Current tobacco non-user (CAD, cap, COPD, PV) (dm) (IBD) (1036F, ) - - 2020 Colorectal cancer screening results documented and reviewed (PV) (3017F) - - 2020 Documentation of current medications. () - - 2020 CMP (comprehensive metabolic panel) (19309) - - 2020 Sed rate (20058) - - 2020 C-reactive protein (49746) - - 2020 Ferritin assay (59809) - - 2020 Iron + iron binding capacity panel ser/plas (89283, 01722) - - 2020 Vitamin B12 and folate measurement (13101, 00992) - - 2020 CBC with diff (97078) - - 2020 25-OH-Vitamin D (20688) - - 2020 Infliximab Concentration and Anti-Infliximab Antibody (84416) - - 2020 Lactoferrin stl QN (71794) - - 2020 Calprotectin stool (97907) - - 2020 InstructionsPatient seen today via telehealth by agreement and consent of patient in light of current COVID-19 pandemic. I used video conferencing during the visit. The patient encounter is appropriate and reasonable under the circumstances given the patient's particular presentation at this time. The patient has been advised of the followin) the potential risks and limitations of this mode of treatment (including but not limited to the absence of in-person examination); 2) the right to refuse telehealth services at any point without affecting the right to future care; 3) the right to receive in-person services, included immediately after this consultation if an urgent need arises; 4) information, including identifiable images or information from this telehealth consult, will only be shared in accordance with HIPPA regulations. Any and all of the patient's and/or patient's family member's questions on this issue have been answered. The patient has verbally consented to be treated via telehealth services. The patient has also been advised to contact this office for worsening conditions or problems, and seek emergency medical treatment and/or call 911 if the patient deems either necessary. (For commercial payers, telehealth video only) More than half of the face-to-face time used for counseling and coordination of care. 40 min appointment (35704 EP) I have documented a list of current medications and reviewed it with the patient. I encouraged the patient to diet and exercise.  Get trough labs including stool biomarkers including IFX level before next infusion  Then TH visit 2 weeks after to discuss short term increase of Malaika to 10 mg/kg   Exam med 31 min [id number]        Electronically Signed by: Rikki Franks MD -Author on May 22, 2020 10:20:34 AM

## 2025-06-04 ENCOUNTER — TELEPHONE ENCOUNTER (OUTPATIENT)
Dept: URBAN - METROPOLITAN AREA CLINIC 97 | Facility: CLINIC | Age: 45
End: 2025-06-04

## 2025-06-12 ENCOUNTER — OFFICE VISIT (OUTPATIENT)
Dept: URBAN - METROPOLITAN AREA CLINIC 97 | Facility: CLINIC | Age: 45
End: 2025-06-12
Payer: COMMERCIAL

## 2025-06-12 DIAGNOSIS — K50.90 CROHN'S DISEASE: ICD-10-CM

## 2025-06-12 PROCEDURE — 96413 CHEMO IV INFUSION 1 HR: CPT | Performed by: INTERNAL MEDICINE

## 2025-06-12 RX ORDER — IBUPROFEN 200 MG
TABLET ORAL
Qty: 0 | Refills: 0 | Status: ACTIVE | COMMUNITY
Start: 1900-01-01

## 2025-06-12 RX ORDER — INFLIXIMAB 100 MG/10ML
AS DIRECTED INJECTION, POWDER, LYOPHILIZED, FOR SOLUTION INTRAVENOUS
Status: ACTIVE | COMMUNITY
Start: 2024-05-18

## 2025-06-12 RX ORDER — TIRZEPATIDE 7.5 MG/.5ML
0.5 ML INJECTION, SOLUTION SUBCUTANEOUS DAILY
Status: ACTIVE | COMMUNITY

## 2025-08-04 ENCOUNTER — OFFICE VISIT (OUTPATIENT)
Dept: URBAN - METROPOLITAN AREA CLINIC 91 | Facility: CLINIC | Age: 45
End: 2025-08-04
Payer: COMMERCIAL

## 2025-08-04 ENCOUNTER — OFFICE VISIT (OUTPATIENT)
Dept: URBAN - METROPOLITAN AREA CLINIC 97 | Facility: CLINIC | Age: 45
End: 2025-08-04

## 2025-08-04 DIAGNOSIS — K50.90 CROHN'S DISEASE: ICD-10-CM

## 2025-08-04 PROCEDURE — 96413 CHEMO IV INFUSION 1 HR: CPT | Performed by: INTERNAL MEDICINE

## 2025-08-04 RX ORDER — IBUPROFEN 200 MG
TABLET ORAL
Qty: 0 | Refills: 0 | Status: ACTIVE | COMMUNITY
Start: 1900-01-01

## 2025-08-04 RX ORDER — TIRZEPATIDE 7.5 MG/.5ML
0.5 ML INJECTION, SOLUTION SUBCUTANEOUS DAILY
Status: ACTIVE | COMMUNITY

## 2025-08-04 RX ORDER — INFLIXIMAB 100 MG/10ML
AS DIRECTED INJECTION, POWDER, LYOPHILIZED, FOR SOLUTION INTRAVENOUS
Status: ACTIVE | COMMUNITY
Start: 2024-05-18

## 2025-08-21 ENCOUNTER — CLAIMS CREATED FROM THE CLAIM WINDOW (OUTPATIENT)
Dept: URBAN - METROPOLITAN AREA SURGERY CENTER 18 | Facility: SURGERY CENTER | Age: 45
End: 2025-08-21

## 2025-08-21 ENCOUNTER — OFFICE VISIT (OUTPATIENT)
Dept: URBAN - METROPOLITAN AREA SURGERY CENTER 18 | Facility: SURGERY CENTER | Age: 45
End: 2025-08-21
Payer: COMMERCIAL

## 2025-08-21 DIAGNOSIS — K50.80 CROHN'S COLITIS: ICD-10-CM

## 2025-08-21 DIAGNOSIS — K63.89 OTHER SPECIFIED DISEASES OF INTESTINE: ICD-10-CM

## 2025-08-21 DIAGNOSIS — K21.9 ACID REFLUX: ICD-10-CM

## 2025-08-21 DIAGNOSIS — K31.89 OTHER DISEASES OF STOMACH AND DUODENUM: ICD-10-CM

## 2025-08-21 PROCEDURE — 45380 COLONOSCOPY AND BIOPSY: CPT | Performed by: INTERNAL MEDICINE

## 2025-08-21 PROCEDURE — 43239 EGD BIOPSY SINGLE/MULTIPLE: CPT | Performed by: INTERNAL MEDICINE

## 2025-08-21 RX ORDER — INFLIXIMAB 100 MG/10ML
AS DIRECTED INJECTION, POWDER, LYOPHILIZED, FOR SOLUTION INTRAVENOUS
Status: ACTIVE | COMMUNITY
Start: 2024-05-18

## 2025-08-21 RX ORDER — TIRZEPATIDE 7.5 MG/.5ML
0.5 ML INJECTION, SOLUTION SUBCUTANEOUS DAILY
Status: ACTIVE | COMMUNITY

## 2025-08-21 RX ORDER — IBUPROFEN 200 MG
TABLET ORAL
Qty: 0 | Refills: 0 | Status: ACTIVE | COMMUNITY
Start: 1900-01-01